# Patient Record
Sex: FEMALE | Race: WHITE | NOT HISPANIC OR LATINO | Employment: FULL TIME | URBAN - METROPOLITAN AREA
[De-identification: names, ages, dates, MRNs, and addresses within clinical notes are randomized per-mention and may not be internally consistent; named-entity substitution may affect disease eponyms.]

---

## 2023-04-13 ENCOUNTER — HOSPITAL ENCOUNTER (INPATIENT)
Facility: CLINIC | Age: 58
LOS: 6 days | Discharge: HOME | DRG: 327 | End: 2023-04-19
Attending: EMERGENCY MEDICINE | Admitting: SURGERY
Payer: COMMERCIAL

## 2023-04-13 ENCOUNTER — APPOINTMENT (OUTPATIENT)
Dept: GENERAL RADIOLOGY | Facility: CLINIC | Age: 58
DRG: 327 | End: 2023-04-13
Attending: EMERGENCY MEDICINE
Payer: COMMERCIAL

## 2023-04-13 ENCOUNTER — APPOINTMENT (OUTPATIENT)
Dept: GENERAL RADIOLOGY | Facility: CLINIC | Age: 58
DRG: 327 | End: 2023-04-13
Attending: SURGERY
Payer: COMMERCIAL

## 2023-04-13 ENCOUNTER — ANESTHESIA (OUTPATIENT)
Dept: SURGERY | Facility: CLINIC | Age: 58
DRG: 327 | End: 2023-04-13
Payer: COMMERCIAL

## 2023-04-13 ENCOUNTER — APPOINTMENT (OUTPATIENT)
Dept: CT IMAGING | Facility: CLINIC | Age: 58
DRG: 327 | End: 2023-04-13
Attending: EMERGENCY MEDICINE
Payer: COMMERCIAL

## 2023-04-13 ENCOUNTER — ANESTHESIA EVENT (OUTPATIENT)
Dept: SURGERY | Facility: CLINIC | Age: 58
DRG: 327 | End: 2023-04-13
Payer: COMMERCIAL

## 2023-04-13 DIAGNOSIS — E87.6 HYPOKALEMIA: ICD-10-CM

## 2023-04-13 DIAGNOSIS — F41.9 ANXIETY: Primary | ICD-10-CM

## 2023-04-13 DIAGNOSIS — K27.5 PERFORATED ULCER (H): ICD-10-CM

## 2023-04-13 LAB
ABO/RH(D): NORMAL
ALBUMIN SERPL BCG-MCNC: 3.7 G/DL (ref 3.5–5.2)
ALP SERPL-CCNC: 56 U/L (ref 35–104)
ALT SERPL W P-5'-P-CCNC: 16 U/L (ref 10–35)
ANION GAP SERPL CALCULATED.3IONS-SCNC: 11 MMOL/L (ref 7–15)
ANTIBODY SCREEN: NEGATIVE
AST SERPL W P-5'-P-CCNC: 18 U/L (ref 10–35)
ATRIAL RATE - MUSE: 84 BPM
BASOPHILS # BLD AUTO: 0 10E3/UL (ref 0–0.2)
BASOPHILS NFR BLD AUTO: 0 %
BILIRUB SERPL-MCNC: 0.4 MG/DL
BUN SERPL-MCNC: 16 MG/DL (ref 6–20)
CALCIUM SERPL-MCNC: 9.5 MG/DL (ref 8.6–10)
CHLORIDE SERPL-SCNC: 96 MMOL/L (ref 98–107)
CREAT SERPL-MCNC: 1.24 MG/DL (ref 0.51–0.95)
CREAT SERPL-MCNC: 1.24 MG/DL (ref 0.51–0.95)
DEPRECATED HCO3 PLAS-SCNC: 30 MMOL/L (ref 22–29)
DIASTOLIC BLOOD PRESSURE - MUSE: NORMAL MMHG
EOSINOPHIL # BLD AUTO: 0.3 10E3/UL (ref 0–0.7)
EOSINOPHIL NFR BLD AUTO: 2 %
ERYTHROCYTE [DISTWIDTH] IN BLOOD BY AUTOMATED COUNT: 14.5 % (ref 10–15)
GFR SERPL CREATININE-BSD FRML MDRD: 51 ML/MIN/1.73M2
GFR SERPL CREATININE-BSD FRML MDRD: 51 ML/MIN/1.73M2
GLUCOSE SERPL-MCNC: 155 MG/DL (ref 70–99)
HCT VFR BLD AUTO: 39.1 % (ref 35–47)
HGB BLD-MCNC: 13.2 G/DL (ref 11.7–15.7)
HOLD SPECIMEN: NORMAL
HOLD SPECIMEN: NORMAL
IMM GRANULOCYTES # BLD: 0 10E3/UL
IMM GRANULOCYTES NFR BLD: 0 %
INTERPRETATION ECG - MUSE: NORMAL
LYMPHOCYTES # BLD AUTO: 1.6 10E3/UL (ref 0.8–5.3)
LYMPHOCYTES NFR BLD AUTO: 14 %
MAGNESIUM SERPL-MCNC: 1.8 MG/DL (ref 1.7–2.3)
MCH RBC QN AUTO: 31.4 PG (ref 26.5–33)
MCHC RBC AUTO-ENTMCNC: 33.8 G/DL (ref 31.5–36.5)
MCV RBC AUTO: 93 FL (ref 78–100)
MONOCYTES # BLD AUTO: 0.6 10E3/UL (ref 0–1.3)
MONOCYTES NFR BLD AUTO: 5 %
NEUTROPHILS # BLD AUTO: 9 10E3/UL (ref 1.6–8.3)
NEUTROPHILS NFR BLD AUTO: 79 %
NRBC # BLD AUTO: 0 10E3/UL
NRBC BLD AUTO-RTO: 0 /100
P AXIS - MUSE: 35 DEGREES
PLATELET # BLD AUTO: 366 10E3/UL (ref 150–450)
POTASSIUM SERPL-SCNC: 2.7 MMOL/L (ref 3.4–5.3)
POTASSIUM SERPL-SCNC: 3.7 MMOL/L (ref 3.4–5.3)
PR INTERVAL - MUSE: 154 MS
PROT SERPL-MCNC: 6.7 G/DL (ref 6.4–8.3)
QRS DURATION - MUSE: 86 MS
QT - MUSE: 380 MS
QTC - MUSE: 449 MS
R AXIS - MUSE: 4 DEGREES
RBC # BLD AUTO: 4.2 10E6/UL (ref 3.8–5.2)
SODIUM SERPL-SCNC: 137 MMOL/L (ref 136–145)
SPECIMEN EXPIRATION DATE: NORMAL
SYSTOLIC BLOOD PRESSURE - MUSE: NORMAL MMHG
T AXIS - MUSE: 14 DEGREES
TROPONIN T SERPL HS-MCNC: 12 NG/L
TROPONIN T SERPL HS-MCNC: 13 NG/L
VENTRICULAR RATE- MUSE: 84 BPM
WBC # BLD AUTO: 11.5 10E3/UL (ref 4–11)

## 2023-04-13 PROCEDURE — 258N000003 HC RX IP 258 OP 636: Performed by: NURSE ANESTHETIST, CERTIFIED REGISTERED

## 2023-04-13 PROCEDURE — 360N000076 HC SURGERY LEVEL 3, PER MIN: Performed by: SURGERY

## 2023-04-13 PROCEDURE — 250N000011 HC RX IP 250 OP 636: Performed by: NURSE ANESTHETIST, CERTIFIED REGISTERED

## 2023-04-13 PROCEDURE — 82565 ASSAY OF CREATININE: CPT | Performed by: PHYSICIAN ASSISTANT

## 2023-04-13 PROCEDURE — 0DU647Z SUPPLEMENT STOMACH WITH AUTOLOGOUS TISSUE SUBSTITUTE, PERCUTANEOUS ENDOSCOPIC APPROACH: ICD-10-PCS | Performed by: SURGERY

## 2023-04-13 PROCEDURE — 85025 COMPLETE CBC W/AUTO DIFF WBC: CPT | Performed by: EMERGENCY MEDICINE

## 2023-04-13 PROCEDURE — 96375 TX/PRO/DX INJ NEW DRUG ADDON: CPT

## 2023-04-13 PROCEDURE — 999N000141 HC STATISTIC PRE-PROCEDURE NURSING ASSESSMENT: Performed by: SURGERY

## 2023-04-13 PROCEDURE — 36415 COLL VENOUS BLD VENIPUNCTURE: CPT | Performed by: EMERGENCY MEDICINE

## 2023-04-13 PROCEDURE — 272N000001 HC OR GENERAL SUPPLY STERILE: Performed by: SURGERY

## 2023-04-13 PROCEDURE — 250N000009 HC RX 250: Performed by: EMERGENCY MEDICINE

## 2023-04-13 PROCEDURE — 0DQ64ZZ REPAIR STOMACH, PERCUTANEOUS ENDOSCOPIC APPROACH: ICD-10-PCS | Performed by: SURGERY

## 2023-04-13 PROCEDURE — C9113 INJ PANTOPRAZOLE SODIUM, VIA: HCPCS | Performed by: PHYSICIAN ASSISTANT

## 2023-04-13 PROCEDURE — 250N000009 HC RX 250: Performed by: SURGERY

## 2023-04-13 PROCEDURE — 80053 COMPREHEN METABOLIC PANEL: CPT | Performed by: EMERGENCY MEDICINE

## 2023-04-13 PROCEDURE — 120N000001 HC R&B MED SURG/OB

## 2023-04-13 PROCEDURE — 84484 ASSAY OF TROPONIN QUANT: CPT | Performed by: EMERGENCY MEDICINE

## 2023-04-13 PROCEDURE — 258N000003 HC RX IP 258 OP 636: Performed by: EMERGENCY MEDICINE

## 2023-04-13 PROCEDURE — 96376 TX/PRO/DX INJ SAME DRUG ADON: CPT

## 2023-04-13 PROCEDURE — 250N000009 HC RX 250: Performed by: NURSE ANESTHETIST, CERTIFIED REGISTERED

## 2023-04-13 PROCEDURE — 43659 UNLISTED LAPS PX STOMACH: CPT | Performed by: SURGERY

## 2023-04-13 PROCEDURE — 250N000013 HC RX MED GY IP 250 OP 250 PS 637: Performed by: NURSE ANESTHETIST, CERTIFIED REGISTERED

## 2023-04-13 PROCEDURE — 99222 1ST HOSP IP/OBS MODERATE 55: CPT | Performed by: PHYSICIAN ASSISTANT

## 2023-04-13 PROCEDURE — 250N000011 HC RX IP 250 OP 636: Performed by: PHYSICIAN ASSISTANT

## 2023-04-13 PROCEDURE — 84132 ASSAY OF SERUM POTASSIUM: CPT | Performed by: PHYSICIAN ASSISTANT

## 2023-04-13 PROCEDURE — C9113 INJ PANTOPRAZOLE SODIUM, VIA: HCPCS | Performed by: EMERGENCY MEDICINE

## 2023-04-13 PROCEDURE — 250N000011 HC RX IP 250 OP 636: Performed by: EMERGENCY MEDICINE

## 2023-04-13 PROCEDURE — 93005 ELECTROCARDIOGRAM TRACING: CPT

## 2023-04-13 PROCEDURE — 99285 EMERGENCY DEPT VISIT HI MDM: CPT | Mod: 25

## 2023-04-13 PROCEDURE — 71046 X-RAY EXAM CHEST 2 VIEWS: CPT

## 2023-04-13 PROCEDURE — 96365 THER/PROPH/DIAG IV INF INIT: CPT | Mod: 59

## 2023-04-13 PROCEDURE — 83735 ASSAY OF MAGNESIUM: CPT | Performed by: PHYSICIAN ASSISTANT

## 2023-04-13 PROCEDURE — 710N000009 HC RECOVERY PHASE 1, LEVEL 1, PER MIN: Performed by: SURGERY

## 2023-04-13 PROCEDURE — 86850 RBC ANTIBODY SCREEN: CPT | Performed by: EMERGENCY MEDICINE

## 2023-04-13 PROCEDURE — 370N000017 HC ANESTHESIA TECHNICAL FEE, PER MIN: Performed by: SURGERY

## 2023-04-13 PROCEDURE — 250N000025 HC SEVOFLURANE, PER MIN: Performed by: SURGERY

## 2023-04-13 PROCEDURE — 43659 UNLISTED LAPS PX STOMACH: CPT | Mod: AS | Performed by: PHYSICIAN ASSISTANT

## 2023-04-13 PROCEDURE — 74177 CT ABD & PELVIS W/CONTRAST: CPT

## 2023-04-13 PROCEDURE — 36415 COLL VENOUS BLD VENIPUNCTURE: CPT | Performed by: PHYSICIAN ASSISTANT

## 2023-04-13 PROCEDURE — 999N000179 XR SURGERY CARM FLUORO LESS THAN 5 MIN W STILLS

## 2023-04-13 RX ORDER — SODIUM CHLORIDE AND POTASSIUM CHLORIDE 150; 900 MG/100ML; MG/100ML
INJECTION, SOLUTION INTRAVENOUS CONTINUOUS
Status: DISCONTINUED | OUTPATIENT
Start: 2023-04-13 | End: 2023-04-16

## 2023-04-13 RX ORDER — GABAPENTIN 300 MG/1
600 CAPSULE ORAL 2 TIMES DAILY
COMMUNITY

## 2023-04-13 RX ORDER — HYDRALAZINE HYDROCHLORIDE 20 MG/ML
10 INJECTION INTRAMUSCULAR; INTRAVENOUS EVERY 6 HOURS PRN
Status: DISCONTINUED | OUTPATIENT
Start: 2023-04-13 | End: 2023-04-19 | Stop reason: HOSPADM

## 2023-04-13 RX ORDER — PROCHLORPERAZINE MALEATE 10 MG
10 TABLET ORAL EVERY 6 HOURS PRN
Status: DISCONTINUED | OUTPATIENT
Start: 2023-04-13 | End: 2023-04-13

## 2023-04-13 RX ORDER — ALBUTEROL SULFATE 90 UG/1
AEROSOL, METERED RESPIRATORY (INHALATION) PRN
Status: DISCONTINUED | OUTPATIENT
Start: 2023-04-13 | End: 2023-04-13

## 2023-04-13 RX ORDER — PIPERACILLIN SODIUM, TAZOBACTAM SODIUM 3; .375 G/15ML; G/15ML
3.38 INJECTION, POWDER, LYOPHILIZED, FOR SOLUTION INTRAVENOUS EVERY 6 HOURS
Status: DISCONTINUED | OUTPATIENT
Start: 2023-04-13 | End: 2023-04-19 | Stop reason: HOSPADM

## 2023-04-13 RX ORDER — PIPERACILLIN SODIUM, TAZOBACTAM SODIUM 3; .375 G/15ML; G/15ML
3.38 INJECTION, POWDER, LYOPHILIZED, FOR SOLUTION INTRAVENOUS ONCE
Status: COMPLETED | OUTPATIENT
Start: 2023-04-13 | End: 2023-04-13

## 2023-04-13 RX ORDER — ONDANSETRON 4 MG/1
4 TABLET, ORALLY DISINTEGRATING ORAL EVERY 30 MIN PRN
Status: DISCONTINUED | OUTPATIENT
Start: 2023-04-13 | End: 2023-04-13 | Stop reason: HOSPADM

## 2023-04-13 RX ORDER — HYDROCHLOROTHIAZIDE 25 MG/1
25 TABLET ORAL DAILY
COMMUNITY

## 2023-04-13 RX ORDER — NALOXONE HYDROCHLORIDE 0.4 MG/ML
0.4 INJECTION, SOLUTION INTRAMUSCULAR; INTRAVENOUS; SUBCUTANEOUS
Status: DISCONTINUED | OUTPATIENT
Start: 2023-04-13 | End: 2023-04-19 | Stop reason: HOSPADM

## 2023-04-13 RX ORDER — FENTANYL CITRATE 50 UG/ML
INJECTION, SOLUTION INTRAMUSCULAR; INTRAVENOUS PRN
Status: DISCONTINUED | OUTPATIENT
Start: 2023-04-13 | End: 2023-04-13

## 2023-04-13 RX ORDER — HYDROMORPHONE HCL IN WATER/PF 6 MG/30 ML
0.2 PATIENT CONTROLLED ANALGESIA SYRINGE INTRAVENOUS
Status: DISCONTINUED | OUTPATIENT
Start: 2023-04-13 | End: 2023-04-19 | Stop reason: HOSPADM

## 2023-04-13 RX ORDER — PROPOFOL 10 MG/ML
INJECTION, EMULSION INTRAVENOUS PRN
Status: DISCONTINUED | OUTPATIENT
Start: 2023-04-13 | End: 2023-04-13

## 2023-04-13 RX ORDER — POTASSIUM CHLORIDE 7.45 MG/ML
10 INJECTION INTRAVENOUS ONCE
Status: COMPLETED | OUTPATIENT
Start: 2023-04-13 | End: 2023-04-13

## 2023-04-13 RX ORDER — HYDROMORPHONE HCL IN WATER/PF 6 MG/30 ML
0.4 PATIENT CONTROLLED ANALGESIA SYRINGE INTRAVENOUS
Status: DISCONTINUED | OUTPATIENT
Start: 2023-04-13 | End: 2023-04-19 | Stop reason: HOSPADM

## 2023-04-13 RX ORDER — SODIUM CHLORIDE, SODIUM LACTATE, POTASSIUM CHLORIDE, CALCIUM CHLORIDE 600; 310; 30; 20 MG/100ML; MG/100ML; MG/100ML; MG/100ML
125 INJECTION, SOLUTION INTRAVENOUS CONTINUOUS
Status: DISCONTINUED | OUTPATIENT
Start: 2023-04-13 | End: 2023-04-13

## 2023-04-13 RX ORDER — SODIUM CHLORIDE, SODIUM LACTATE, POTASSIUM CHLORIDE, CALCIUM CHLORIDE 600; 310; 30; 20 MG/100ML; MG/100ML; MG/100ML; MG/100ML
INJECTION, SOLUTION INTRAVENOUS CONTINUOUS PRN
Status: DISCONTINUED | OUTPATIENT
Start: 2023-04-13 | End: 2023-04-13

## 2023-04-13 RX ORDER — DEXAMETHASONE SODIUM PHOSPHATE 4 MG/ML
INJECTION, SOLUTION INTRA-ARTICULAR; INTRALESIONAL; INTRAMUSCULAR; INTRAVENOUS; SOFT TISSUE PRN
Status: DISCONTINUED | OUTPATIENT
Start: 2023-04-13 | End: 2023-04-13

## 2023-04-13 RX ORDER — SODIUM CHLORIDE, SODIUM LACTATE, POTASSIUM CHLORIDE, CALCIUM CHLORIDE 600; 310; 30; 20 MG/100ML; MG/100ML; MG/100ML; MG/100ML
INJECTION, SOLUTION INTRAVENOUS CONTINUOUS
Status: DISCONTINUED | OUTPATIENT
Start: 2023-04-13 | End: 2023-04-13 | Stop reason: HOSPADM

## 2023-04-13 RX ORDER — ONDANSETRON 4 MG/1
4 TABLET, ORALLY DISINTEGRATING ORAL EVERY 6 HOURS PRN
Status: DISCONTINUED | OUTPATIENT
Start: 2023-04-13 | End: 2023-04-19 | Stop reason: HOSPADM

## 2023-04-13 RX ORDER — IOPAMIDOL 755 MG/ML
135 INJECTION, SOLUTION INTRAVASCULAR ONCE
Status: COMPLETED | OUTPATIENT
Start: 2023-04-13 | End: 2023-04-13

## 2023-04-13 RX ORDER — DIAZEPAM 10 MG/2ML
2.5 INJECTION, SOLUTION INTRAMUSCULAR; INTRAVENOUS EVERY 4 HOURS PRN
Status: DISCONTINUED | OUTPATIENT
Start: 2023-04-13 | End: 2023-04-18

## 2023-04-13 RX ORDER — LIDOCAINE HYDROCHLORIDE 20 MG/ML
INJECTION, SOLUTION INFILTRATION; PERINEURAL PRN
Status: DISCONTINUED | OUTPATIENT
Start: 2023-04-13 | End: 2023-04-13

## 2023-04-13 RX ORDER — ENOXAPARIN SODIUM 100 MG/ML
40 INJECTION SUBCUTANEOUS EVERY 12 HOURS
Status: DISCONTINUED | OUTPATIENT
Start: 2023-04-14 | End: 2023-04-19 | Stop reason: HOSPADM

## 2023-04-13 RX ORDER — NALOXONE HYDROCHLORIDE 0.4 MG/ML
0.2 INJECTION, SOLUTION INTRAMUSCULAR; INTRAVENOUS; SUBCUTANEOUS
Status: DISCONTINUED | OUTPATIENT
Start: 2023-04-13 | End: 2023-04-19 | Stop reason: HOSPADM

## 2023-04-13 RX ORDER — ONDANSETRON 2 MG/ML
4 INJECTION INTRAMUSCULAR; INTRAVENOUS ONCE
Status: COMPLETED | OUTPATIENT
Start: 2023-04-13 | End: 2023-04-13

## 2023-04-13 RX ORDER — ONDANSETRON 2 MG/ML
INJECTION INTRAMUSCULAR; INTRAVENOUS PRN
Status: DISCONTINUED | OUTPATIENT
Start: 2023-04-13 | End: 2023-04-13

## 2023-04-13 RX ORDER — FENTANYL CITRATE 50 UG/ML
50 INJECTION, SOLUTION INTRAMUSCULAR; INTRAVENOUS EVERY 5 MIN PRN
Status: DISCONTINUED | OUTPATIENT
Start: 2023-04-13 | End: 2023-04-13 | Stop reason: HOSPADM

## 2023-04-13 RX ORDER — HYDROMORPHONE HYDROCHLORIDE 1 MG/ML
0.5 INJECTION, SOLUTION INTRAMUSCULAR; INTRAVENOUS; SUBCUTANEOUS
Status: COMPLETED | OUTPATIENT
Start: 2023-04-13 | End: 2023-04-13

## 2023-04-13 RX ORDER — FLUCONAZOLE 2 MG/ML
400 INJECTION, SOLUTION INTRAVENOUS EVERY 24 HOURS
Status: DISCONTINUED | OUTPATIENT
Start: 2023-04-13 | End: 2023-04-13

## 2023-04-13 RX ORDER — DEXMEDETOMIDINE HYDROCHLORIDE 4 UG/ML
INJECTION, SOLUTION INTRAVENOUS PRN
Status: DISCONTINUED | OUTPATIENT
Start: 2023-04-13 | End: 2023-04-13

## 2023-04-13 RX ORDER — POTASSIUM CHLORIDE 7.45 MG/ML
INJECTION INTRAVENOUS PRN
Status: DISCONTINUED | OUTPATIENT
Start: 2023-04-13 | End: 2023-04-13

## 2023-04-13 RX ORDER — ONDANSETRON 2 MG/ML
4 INJECTION INTRAMUSCULAR; INTRAVENOUS EVERY 6 HOURS PRN
Status: DISCONTINUED | OUTPATIENT
Start: 2023-04-13 | End: 2023-04-19 | Stop reason: HOSPADM

## 2023-04-13 RX ORDER — MAGNESIUM SULFATE HEPTAHYDRATE 40 MG/ML
2 INJECTION, SOLUTION INTRAVENOUS ONCE
Status: COMPLETED | OUTPATIENT
Start: 2023-04-13 | End: 2023-04-13

## 2023-04-13 RX ORDER — FENTANYL CITRATE 50 UG/ML
25 INJECTION, SOLUTION INTRAMUSCULAR; INTRAVENOUS EVERY 5 MIN PRN
Status: DISCONTINUED | OUTPATIENT
Start: 2023-04-13 | End: 2023-04-13 | Stop reason: HOSPADM

## 2023-04-13 RX ORDER — PROPOFOL 10 MG/ML
INJECTION, EMULSION INTRAVENOUS CONTINUOUS PRN
Status: DISCONTINUED | OUTPATIENT
Start: 2023-04-13 | End: 2023-04-13

## 2023-04-13 RX ORDER — HYDROMORPHONE HCL IN WATER/PF 6 MG/30 ML
0.2 PATIENT CONTROLLED ANALGESIA SYRINGE INTRAVENOUS EVERY 5 MIN PRN
Status: DISCONTINUED | OUTPATIENT
Start: 2023-04-13 | End: 2023-04-13 | Stop reason: HOSPADM

## 2023-04-13 RX ORDER — HYDROMORPHONE HCL IN WATER/PF 6 MG/30 ML
0.4 PATIENT CONTROLLED ANALGESIA SYRINGE INTRAVENOUS EVERY 5 MIN PRN
Status: DISCONTINUED | OUTPATIENT
Start: 2023-04-13 | End: 2023-04-13 | Stop reason: HOSPADM

## 2023-04-13 RX ORDER — ACETAMINOPHEN 650 MG/1
650 SUPPOSITORY RECTAL EVERY 4 HOURS PRN
Status: DISCONTINUED | OUTPATIENT
Start: 2023-04-13 | End: 2023-04-17

## 2023-04-13 RX ORDER — ONDANSETRON 2 MG/ML
4 INJECTION INTRAMUSCULAR; INTRAVENOUS EVERY 30 MIN PRN
Status: DISCONTINUED | OUTPATIENT
Start: 2023-04-13 | End: 2023-04-13 | Stop reason: HOSPADM

## 2023-04-13 RX ORDER — ONDANSETRON 2 MG/ML
4 INJECTION INTRAMUSCULAR; INTRAVENOUS EVERY 30 MIN PRN
Status: DISCONTINUED | OUTPATIENT
Start: 2023-04-13 | End: 2023-04-13

## 2023-04-13 RX ORDER — LIDOCAINE 40 MG/G
CREAM TOPICAL
Status: DISCONTINUED | OUTPATIENT
Start: 2023-04-13 | End: 2023-04-19 | Stop reason: HOSPADM

## 2023-04-13 RX ORDER — LIDOCAINE HYDROCHLORIDE 10 MG/ML
INJECTION, SOLUTION INFILTRATION; PERINEURAL
Status: DISCONTINUED
Start: 2023-04-13 | End: 2023-04-13 | Stop reason: HOSPADM

## 2023-04-13 RX ORDER — PIPERACILLIN SODIUM, TAZOBACTAM SODIUM 3; .375 G/15ML; G/15ML
INJECTION, POWDER, LYOPHILIZED, FOR SOLUTION INTRAVENOUS PRN
Status: DISCONTINUED | OUTPATIENT
Start: 2023-04-13 | End: 2023-04-13

## 2023-04-13 RX ORDER — LOSARTAN POTASSIUM 100 MG/1
100 TABLET ORAL DAILY
COMMUNITY

## 2023-04-13 RX ORDER — BUSPIRONE HYDROCHLORIDE 30 MG/1
30 TABLET ORAL
Status: ON HOLD | COMMUNITY
End: 2023-04-14

## 2023-04-13 RX ORDER — AMLODIPINE BESYLATE 5 MG/1
5 TABLET ORAL DAILY
COMMUNITY

## 2023-04-13 RX ORDER — OXYCODONE HYDROCHLORIDE 5 MG/1
5 TABLET ORAL EVERY 4 HOURS PRN
Status: DISCONTINUED | OUTPATIENT
Start: 2023-04-13 | End: 2023-04-13

## 2023-04-13 RX ADMIN — PHENYLEPHRINE HYDROCHLORIDE 100 MCG: 10 INJECTION INTRAVENOUS at 08:30

## 2023-04-13 RX ADMIN — ROCURONIUM BROMIDE 10 MG: 50 INJECTION, SOLUTION INTRAVENOUS at 09:46

## 2023-04-13 RX ADMIN — POTASSIUM CHLORIDE 10 MEQ: 7.46 INJECTION, SOLUTION INTRAVENOUS at 10:35

## 2023-04-13 RX ADMIN — PHENYLEPHRINE HYDROCHLORIDE 0.5 MCG/KG/MIN: 10 INJECTION INTRAVENOUS at 08:28

## 2023-04-13 RX ADMIN — DEXMEDETOMIDINE HYDROCHLORIDE 12 MCG: 200 INJECTION INTRAVENOUS at 08:45

## 2023-04-13 RX ADMIN — PANTOPRAZOLE SODIUM 80 MG: 40 INJECTION, POWDER, FOR SOLUTION INTRAVENOUS at 07:23

## 2023-04-13 RX ADMIN — SODIUM CHLORIDE, POTASSIUM CHLORIDE, SODIUM LACTATE AND CALCIUM CHLORIDE: 600; 310; 30; 20 INJECTION, SOLUTION INTRAVENOUS at 08:25

## 2023-04-13 RX ADMIN — PROPOFOL 200 MG: 10 INJECTION, EMULSION INTRAVENOUS at 08:29

## 2023-04-13 RX ADMIN — ONDANSETRON 4 MG: 2 INJECTION INTRAMUSCULAR; INTRAVENOUS at 05:53

## 2023-04-13 RX ADMIN — HYDROMORPHONE HYDROCHLORIDE 0.4 MG: 0.2 INJECTION, SOLUTION INTRAMUSCULAR; INTRAVENOUS; SUBCUTANEOUS at 19:59

## 2023-04-13 RX ADMIN — MIDAZOLAM 1 MG: 1 INJECTION INTRAMUSCULAR; INTRAVENOUS at 08:22

## 2023-04-13 RX ADMIN — PIPERACILLIN AND TAZOBACTAM 3.38 G: 3; .375 INJECTION, POWDER, FOR SOLUTION INTRAVENOUS at 06:42

## 2023-04-13 RX ADMIN — FENTANYL CITRATE 50 MCG: 50 INJECTION, SOLUTION INTRAMUSCULAR; INTRAVENOUS at 09:01

## 2023-04-13 RX ADMIN — ROCURONIUM BROMIDE 10 MG: 50 INJECTION, SOLUTION INTRAVENOUS at 09:30

## 2023-04-13 RX ADMIN — SODIUM CHLORIDE, SODIUM LACTATE, POTASSIUM CHLORIDE, CALCIUM CHLORIDE: 600; 310; 30; 20 INJECTION, SOLUTION INTRAVENOUS at 09:00

## 2023-04-13 RX ADMIN — FENTANYL CITRATE 50 MCG: 50 INJECTION, SOLUTION INTRAMUSCULAR; INTRAVENOUS at 08:22

## 2023-04-13 RX ADMIN — DEXAMETHASONE SODIUM PHOSPHATE 4 MG: 4 INJECTION, SOLUTION INTRA-ARTICULAR; INTRALESIONAL; INTRAMUSCULAR; INTRAVENOUS; SOFT TISSUE at 08:45

## 2023-04-13 RX ADMIN — PIPERACILLIN AND TAZOBACTAM 3.38 G: 3; .375 INJECTION, POWDER, FOR SOLUTION INTRAVENOUS at 14:04

## 2023-04-13 RX ADMIN — HYDROMORPHONE HYDROCHLORIDE 0.5 MG: 1 INJECTION, SOLUTION INTRAMUSCULAR; INTRAVENOUS; SUBCUTANEOUS at 05:55

## 2023-04-13 RX ADMIN — PROPOFOL 30 MCG/KG/MIN: 10 INJECTION, EMULSION INTRAVENOUS at 08:45

## 2023-04-13 RX ADMIN — HYDROMORPHONE HYDROCHLORIDE 0.5 MG: 1 INJECTION, SOLUTION INTRAMUSCULAR; INTRAVENOUS; SUBCUTANEOUS at 06:40

## 2023-04-13 RX ADMIN — HYDROMORPHONE HYDROCHLORIDE 0.4 MG: 0.2 INJECTION, SOLUTION INTRAMUSCULAR; INTRAVENOUS; SUBCUTANEOUS at 17:08

## 2023-04-13 RX ADMIN — ALBUTEROL SULFATE 4 PUFF: 90 AEROSOL, METERED RESPIRATORY (INHALATION) at 08:40

## 2023-04-13 RX ADMIN — ROCURONIUM BROMIDE 20 MG: 50 INJECTION, SOLUTION INTRAVENOUS at 08:56

## 2023-04-13 RX ADMIN — LIDOCAINE HYDROCHLORIDE 100 MG: 20 INJECTION, SOLUTION INFILTRATION; PERINEURAL at 08:29

## 2023-04-13 RX ADMIN — ROCURONIUM BROMIDE 10 MG: 50 INJECTION, SOLUTION INTRAVENOUS at 09:15

## 2023-04-13 RX ADMIN — ROCURONIUM BROMIDE 45 MG: 50 INJECTION, SOLUTION INTRAVENOUS at 08:40

## 2023-04-13 RX ADMIN — SUGAMMADEX 300 MG: 100 INJECTION, SOLUTION INTRAVENOUS at 10:42

## 2023-04-13 RX ADMIN — HYDROMORPHONE HYDROCHLORIDE 0.4 MG: 0.2 INJECTION, SOLUTION INTRAMUSCULAR; INTRAVENOUS; SUBCUTANEOUS at 21:59

## 2023-04-13 RX ADMIN — SODIUM CHLORIDE, SODIUM LACTATE, POTASSIUM CHLORIDE, CALCIUM CHLORIDE: 600; 310; 30; 20 INJECTION, SOLUTION INTRAVENOUS at 08:19

## 2023-04-13 RX ADMIN — HYDROMORPHONE HYDROCHLORIDE 0.5 MG: 1 INJECTION, SOLUTION INTRAMUSCULAR; INTRAVENOUS; SUBCUTANEOUS at 10:20

## 2023-04-13 RX ADMIN — HYDROMORPHONE HYDROCHLORIDE 0.5 MG: 1 INJECTION, SOLUTION INTRAMUSCULAR; INTRAVENOUS; SUBCUTANEOUS at 09:32

## 2023-04-13 RX ADMIN — ONDANSETRON 4 MG: 2 INJECTION INTRAMUSCULAR; INTRAVENOUS at 10:22

## 2023-04-13 RX ADMIN — ROCURONIUM BROMIDE 5 MG: 50 INJECTION, SOLUTION INTRAVENOUS at 08:29

## 2023-04-13 RX ADMIN — POTASSIUM CHLORIDE AND SODIUM CHLORIDE: 900; 150 INJECTION, SOLUTION INTRAVENOUS at 12:58

## 2023-04-13 RX ADMIN — POTASSIUM CHLORIDE 10 MEQ: 7.46 INJECTION, SOLUTION INTRAVENOUS at 09:34

## 2023-04-13 RX ADMIN — ONDANSETRON 4 MG: 2 INJECTION INTRAMUSCULAR; INTRAVENOUS at 02:33

## 2023-04-13 RX ADMIN — DEXMEDETOMIDINE HYDROCHLORIDE 8 MCG: 200 INJECTION INTRAVENOUS at 09:25

## 2023-04-13 RX ADMIN — POTASSIUM CHLORIDE 10 MEQ: 7.46 INJECTION, SOLUTION INTRAVENOUS at 08:35

## 2023-04-13 RX ADMIN — POTASSIUM CHLORIDE AND SODIUM CHLORIDE: 900; 150 INJECTION, SOLUTION INTRAVENOUS at 21:59

## 2023-04-13 RX ADMIN — PANTOPRAZOLE SODIUM 40 MG: 40 INJECTION, POWDER, FOR SOLUTION INTRAVENOUS at 21:59

## 2023-04-13 RX ADMIN — SUCCINYLCHOLINE CHLORIDE 180 MG: 20 INJECTION, SOLUTION INTRAMUSCULAR; INTRAVENOUS; PARENTERAL at 08:29

## 2023-04-13 RX ADMIN — PIPERACILLIN AND TAZOBACTAM 3.38 G: 3; .375 INJECTION, POWDER, FOR SOLUTION INTRAVENOUS at 09:58

## 2023-04-13 RX ADMIN — MAGNESIUM SULFATE HEPTAHYDRATE 2 G: 40 INJECTION, SOLUTION INTRAVENOUS at 08:04

## 2023-04-13 RX ADMIN — SODIUM CHLORIDE 79 ML: 9 INJECTION, SOLUTION INTRAVENOUS at 06:16

## 2023-04-13 RX ADMIN — SODIUM CHLORIDE, POTASSIUM CHLORIDE, SODIUM LACTATE AND CALCIUM CHLORIDE 1000 ML: 600; 310; 30; 20 INJECTION, SOLUTION INTRAVENOUS at 07:28

## 2023-04-13 RX ADMIN — POTASSIUM CHLORIDE 10 MEQ: 7.46 INJECTION, SOLUTION INTRAVENOUS at 07:37

## 2023-04-13 RX ADMIN — PANTOPRAZOLE SODIUM 40 MG: 40 INJECTION, POWDER, FOR SOLUTION INTRAVENOUS at 14:03

## 2023-04-13 RX ADMIN — PIPERACILLIN AND TAZOBACTAM 3.38 G: 3; .375 INJECTION, POWDER, FOR SOLUTION INTRAVENOUS at 18:33

## 2023-04-13 RX ADMIN — IOPAMIDOL 135 ML: 755 INJECTION, SOLUTION INTRAVENOUS at 06:15

## 2023-04-13 RX ADMIN — HYDROMORPHONE HYDROCHLORIDE 0.2 MG: 0.2 INJECTION, SOLUTION INTRAMUSCULAR; INTRAVENOUS; SUBCUTANEOUS at 15:05

## 2023-04-13 RX ADMIN — ALBUTEROL SULFATE 4 PUFF: 90 AEROSOL, METERED RESPIRATORY (INHALATION) at 10:42

## 2023-04-13 RX ADMIN — HYDROMORPHONE HYDROCHLORIDE 0.5 MG: 1 INJECTION, SOLUTION INTRAMUSCULAR; INTRAVENOUS; SUBCUTANEOUS at 07:22

## 2023-04-13 ASSESSMENT — ENCOUNTER SYMPTOMS
SEIZURES: 0
ABDOMINAL PAIN: 1
NAUSEA: 1
DIARRHEA: 0
CONSTIPATION: 0
VOMITING: 0
DYSRHYTHMIAS: 0
ARTHRALGIAS: 1

## 2023-04-13 ASSESSMENT — ACTIVITIES OF DAILY LIVING (ADL)
ADLS_ACUITY_SCORE: 39
ADLS_ACUITY_SCORE: 35
ADLS_ACUITY_SCORE: 35
ADLS_ACUITY_SCORE: 39
ADLS_ACUITY_SCORE: 35
ADLS_ACUITY_SCORE: 39
ADLS_ACUITY_SCORE: 39
ADLS_ACUITY_SCORE: 35
ADLS_ACUITY_SCORE: 35
ADLS_ACUITY_SCORE: 39
ADLS_ACUITY_SCORE: 35

## 2023-04-13 ASSESSMENT — LIFESTYLE VARIABLES: TOBACCO_USE: 0

## 2023-04-13 NOTE — ANESTHESIA CARE TRANSFER NOTE
Patient: Silvia Yoon    Procedure: Procedure(s):  Diagnostic laparoscopy with repair of perforated ulcer,       Diagnosis: Perforated ulcer (H) [K27.5]  Diagnosis Additional Information: No value filed.    Anesthesia Type:   General     Note:    Oropharynx: oropharynx clear of all foreign objects and spontaneously breathing  Level of Consciousness: drowsy  Oxygen Supplementation: face mask  Level of Supplemental Oxygen (L/min / FiO2): 8  Independent Airway: airway patency satisfactory and stable  Dentition: dentition unchanged  Vital Signs Stable: post-procedure vital signs reviewed and stable  Report to RN Given: handoff report given  Patient transferred to: PACU    Handoff Report: Identifed the Patient, Identified the Reponsible Provider, Reviewed the pertinent medical history, Discussed the surgical course, Reviewed Intra-OP anesthesia mangement and issues during anesthesia, Set expectations for post-procedure period and Allowed opportunity for questions and acknowledgement of understanding      Vitals:  Vitals Value Taken Time   /96 04/13/23 1100   Temp     Pulse 101 04/13/23 1103   Resp 23 04/13/23 1103   SpO2 96 % 04/13/23 1103       Electronically Signed By: ELÍAS Sawant CRNA  April 13, 2023  11:04 AM

## 2023-04-13 NOTE — ANESTHESIA PROCEDURE NOTES
Airway       Patient location during procedure: OR       Procedure Start/Stop Times: 4/13/2023 8:32 AM  Staff -        Anesthesiologist:  Celio Alberts MD       CRNA: Demarco William APRN CRNA       Performed By: CRNA  Consent for Airway        Urgency: elective  Indications and Patient Condition       Indications for airway management: tanner-procedural and airway protection       Induction type:RSI       Mask difficulty assessment: 0 - not attempted    Final Airway Details       Final airway type: endotracheal airway       Successful airway: ETT - single and Oral  Endotracheal Airway Details        ETT size (mm): 7.0       Cuffed: yes       Successful intubation technique: video laryngoscopy       VL Blade Size: Glidescope 3       Grade View of Cords: 1       Adjucts: stylet       Position: Right       Measured from: lips       Secured at (cm): 21       Bite block used: None    Post intubation assessment        Placement verified by: capnometry, equal breath sounds and chest rise        Number of attempts at approach: 1       Number of other approaches attempted: 0       Secured with: pink tape       Ease of procedure: easy       Dentition: Unchanged    Medication(s) Administered   Medication Administration Time: 4/13/2023 8:32 AM

## 2023-04-13 NOTE — ANESTHESIA PREPROCEDURE EVALUATION
Anesthesia Pre-Procedure Evaluation    Patient: Silvia Yoon   MRN: [id number] : 1965        Procedure : Procedure(s):  Diagnostic laparoscopy with repair of perforated ulcer, possible laparotomy          No past medical history on file.   No past surgical history on file.   Allergies   Allergen Reactions     Naproxen Unknown     Club feet and burning ankles.      Social History     Tobacco Use     Smoking status: Not on file     Smokeless tobacco: Not on file   Substance Use Topics     Alcohol use: Not on file      Wt Readings from Last 1 Encounters:   23 (!) 158.8 kg (350 lb)        Anesthesia Evaluation   Pt has had prior anesthetic. Type: General.    No history of anesthetic complications       ROS/MED HX  ENT/Pulmonary:     (+) sleep apnea, uses CPAP,  (-) tobacco use and asthma   Neurologic:     (+) no peripheral neuropathy  (-) no seizures and no CVA   Cardiovascular:     (+) hypertension----- (-) CAD and arrhythmias   METS/Exercise Tolerance:     Hematologic:       Musculoskeletal:       GI/Hepatic: Comment: Perforated ulcer    (+) GERD,     Renal/Genitourinary:    (-) renal disease   Endo:     (+) Obesity,  (-) Type II DM and thyroid disease   Psychiatric/Substance Use:       Infectious Disease:    (-) Recent Fever   Malignancy:       Other: Comment: Low potassium and magnesium, being replaced           Physical Exam    Airway  airway exam normal      Mallampati: III   TM distance: > 3 FB   Neck ROM: full   Mouth opening: > 3 cm    Respiratory Devices and Support         Dental       (+) Minor Abnormalities - some fillings, tiny chips      Cardiovascular   cardiovascular exam normal          Pulmonary   pulmonary exam normal                   Recent Results (from the past 744 hour(s))   XR Chest 2 Views    Narrative    EXAM: XR CHEST 2 VIEWS  LOCATION: [facility]  DATE/TIME: 2023 4:59 AM CDT    INDICATION: chest pain  COMPARISON: None.      Impression     IMPRESSION: Free air under both hemidiaphragms do not appear to be intraluminal, concerning for pneumoperitoneum. Lungs, costophrenic angles clear. Heart size normal.   CT Abdomen Pelvis w Contrast    Narrative    EXAM: CT ABDOMEN PELVIS W CONTRAST  LOCATION: [facility]  DATE/TIME: 4/13/2023 6:31 AM CDT    INDICATION: abd pain, free air on CXR  COMPARISON: None.  TECHNIQUE: CT scan of the abdomen and pelvis was performed following injection of IV contrast. Multiplanar reformats were obtained. Dose reduction techniques were used.  CONTRAST: 135mL Isovue 370    FINDINGS:   LOWER CHEST: Normal.    HEPATOBILIARY: Normal.    PANCREAS: Normal.    SPLEEN: Normal.    ADRENAL GLANDS: Normal.    KIDNEYS/BLADDER: Normal.    BOWEL: Free air in the upper abdomen anteriorly with stranding in the region of the stomach and duodenum. There is an air-fluid collection posterior to the stomach containing some high attenuation fluid posteriorly. Findings are concerning for perforated   ulcer. There is some free fluid adjacent to the spleen. Moderate free fluid in the pelvic cul-de-sac. Clinical correlation for exudative fluid at the time of surgery. Findings were discussed with the ordering clinician, HERMES MCMULLEN, at approximately   0642 hours. The appendix is seen and appears within normal limits.. Remainder of the bowel gas pattern is nonspecific.    LYMPH NODES: Normal.    VASCULATURE: Unremarkable.    PELVIC ORGANS: 6 the uterus and bilateral adnexal structures appear within normal limits.    MUSCULOSKELETAL: Moderate lumbar spondylotic changes.      Impression    IMPRESSION:   1.  Free air in the upper abdomen anteriorly with stranding in the region of the stomach and duodenum. There is an air-fluid collection posterior to the stomach containing some high attenuation fluid posteriorly. Findings are concerning for perforated   ulcer. There is some free fluid adjacent to the spleen. Moderate free fluid  in the pelvic cul-de-sac. Clinical correlation for exudative fluid at the time of surgery. Findings were discussed with the ordering clinician, HERMES MCMULLEN, at approximately   0642 hours. Remainder of the bowel gas pattern is nonspecific.       OUTSIDE LABS:  CBC:   Lab Results   Component Value Date    WBC 11.5 (H) 04/13/2023    HGB 13.2 04/13/2023    HCT 39.1 04/13/2023     04/13/2023     BMP:   Lab Results   Component Value Date     04/13/2023    POTASSIUM 2.7 (L) 04/13/2023    CHLORIDE 96 (L) 04/13/2023    CO2 30 (H) 04/13/2023    BUN 16.0 04/13/2023    CR 1.24 (H) 04/13/2023     (H) 04/13/2023     COAGS: No results found for: PTT, INR, FIBR  POC: No results found for: BGM, HCG, HCGS  HEPATIC:   Lab Results   Component Value Date    ALBUMIN 3.7 04/13/2023    PROTTOTAL 6.7 04/13/2023    ALT 16 04/13/2023    AST 18 04/13/2023    ALKPHOS 56 04/13/2023    BILITOTAL 0.4 04/13/2023     OTHER:   Lab Results   Component Value Date    PHILIPP 9.5 04/13/2023       Anesthesia Plan    ASA Status:  3, emergent    NPO Status:  ELEVATED Aspiration Risk/Unknown (>8 hours NPO)    Anesthesia Type: General.     - Airway: ETT   Induction: RSI.   Maintenance: Balanced.   Techniques and Equipment:     - Airway: Video-Laryngoscope         Consents    Anesthesia Plan(s) and associated risks, benefits, and realistic alternatives discussed. Questions answered and patient/representative(s) expressed understanding.    - Discussed:     - Discussed with:  Patient         Postoperative Care    Pain management: IV analgesics, Oral pain medications.   PONV prophylaxis: Background Propofol Infusion, Ondansetron (or other 5HT-3), Dexamethasone or Solumedrol     Comments:                Celio Alberts MD

## 2023-04-13 NOTE — ED PROVIDER NOTES
"  History     Chief Complaint:  Chest Pain       HPI   Silvia Yoon is a 57 year old female with a history of hypertension who presents with abdominal pain. Silvia states that she has generally been feeling unwell for the past few days. Last night, however, Silvia states that she was awoken from sleep at 2315 with sudden severe chest and abdominal pain. Since then, Silvia has also noted some nausea and gait issues due to pain. During evaluation, Silvia states that her pain does radiate into her left shoulder and is made worse with deep inspiration. She denies any constipation, diarrhea, or emesis.     Independent Historian:   None - Patient Only      ROS:  Review of Systems   Cardiovascular: Positive for chest pain.   Gastrointestinal: Positive for abdominal pain and nausea. Negative for constipation, diarrhea and vomiting.   Musculoskeletal: Positive for arthralgias (left shoulder) and gait problem.   All other systems reviewed and are negative.      Allergies:  Naproxen     Medications:    Amlodipine   Buspirone   Gabapentin  Hydrochlorothiazide   Losartan   Trazodone     Past Medical History:    Hypertension  Lumbar spinal stenosis   Insomnia  Iron deficiency anemia  Obesity  ADD  Anxiety   RLS  Depression  Vitamin D deficiency  Panic disorder   Fibromatosis   GERD    Past Surgical History:    Adenoidectomy   Breast fibroid removal   Excision lesion, back  Tonsillectomy  Right foot surgery      Family History:    Arthritis - mother  Asthma - mother  Diabetes - mother  Heart disease - mother  Hypertension - mother    Social History:  Patient is unaccompanied in the ED.  Patient has no established PCP in the system.     Physical Exam     Patient Vitals for the past 24 hrs:   BP Temp Temp src Pulse Resp SpO2 Height Weight   04/13/23 0330 (!) 166/95 -- -- 83 26 99 % -- --   04/13/23 0300 (!) 156/100 -- -- 85 20 99 % -- --   04/13/23 0236 (!) 143/94 98.7  F (37.1  C) Oral -- -- -- 1.626 m (5' 4\") (!) 158.8 " kg (350 lb)   04/13/23 0223 -- -- -- 84 23 97 % -- --        Physical Exam  General: Appears well-developed and well-nourished.   Head: No signs of trauma.   CV: Normal rate and regular rhythm.    Resp: Effort normal and breath sounds normal. No respiratory distress.   GI: Soft. There is diffuse tenderness.  No rebound or guarding.  Normal bowel sounds.    MSK: Normal range of motion. no edema. No Calf tenderness.  Neuro: The patient is alert and oriented. Speech normal.  Skin: Skin is warm and dry. No rash noted.   Psych: normal mood and affect. behavior is normal.       Emergency Department Course   ECG  ECG taken at 0206, ECG read at 0541  Normal sinus rhythm  Nonspecific ST and T wave abnormality  Abnormal ECG   Rate 84 bpm. RI interval 154 ms. QRS duration 86 ms. QT/QTc 380/449 ms. P-R-T axes 35 4 14.     Imaging:  CT Abdomen Pelvis w Contrast   Final Result   IMPRESSION:    1.  Free air in the upper abdomen anteriorly with stranding in the region of the stomach and duodenum. There is an air-fluid collection posterior to the stomach containing some high attenuation fluid posteriorly. Findings are concerning for perforated    ulcer. There is some free fluid adjacent to the spleen. Moderate free fluid in the pelvic cul-de-sac. Clinical correlation for exudative fluid at the time of surgery. Findings were discussed with the ordering clinician, HERMES MCMULLEN, at approximately    0642 hours. Remainder of the bowel gas pattern is nonspecific.      XR Chest 2 Views   Final Result   IMPRESSION: Free air under both hemidiaphragms do not appear to be intraluminal, concerning for pneumoperitoneum. Lungs, costophrenic angles clear. Heart size normal.         Report per radiology    Laboratory:  Labs Ordered and Resulted from Time of ED Arrival to Time of ED Departure   COMPREHENSIVE METABOLIC PANEL - Abnormal       Result Value    Sodium 137      Potassium 2.7 (*)     Chloride 96 (*)     Carbon Dioxide (CO2) 30 (*)      Anion Gap 11      Urea Nitrogen 16.0      Creatinine 1.24 (*)     Calcium 9.5      Glucose 155 (*)     Alkaline Phosphatase 56      AST 18      ALT 16      Protein Total 6.7      Albumin 3.7      Bilirubin Total 0.4      GFR Estimate 51 (*)    CBC WITH PLATELETS AND DIFFERENTIAL - Abnormal    WBC Count 11.5 (*)     RBC Count 4.20      Hemoglobin 13.2      Hematocrit 39.1      MCV 93      MCH 31.4      MCHC 33.8      RDW 14.5      Platelet Count 366      % Neutrophils 79      % Lymphocytes 14      % Monocytes 5      % Eosinophils 2      % Basophils 0      % Immature Granulocytes 0      NRBCs per 100 WBC 0      Absolute Neutrophils 9.0 (*)     Absolute Lymphocytes 1.6      Absolute Monocytes 0.6      Absolute Eosinophils 0.3      Absolute Basophils 0.0      Absolute Immature Granulocytes 0.0      Absolute NRBCs 0.0     TROPONIN T, HIGH SENSITIVITY - Normal    Troponin T, High Sensitivity 13     TROPONIN T, HIGH SENSITIVITY - Normal    Troponin T, High Sensitivity 12          Procedures   none    Emergency Department Course & Assessments:       Interventions:  Medications   lactated ringers BOLUS 1,000 mL (has no administration in time range)     Followed by   lactated ringers infusion (has no administration in time range)   ondansetron (ZOFRAN) injection 4 mg (4 mg Intravenous $Given 4/13/23 9562)   HYDROmorphone (PF) (DILAUDID) injection 0.5 mg (0.5 mg Intravenous $Given 4/13/23 0640)   piperacillin-tazobactam (ZOSYN) 3.375 g vial to attach to  mL bag (3.375 g Intravenous $New Bag 4/13/23 0605)   potassium chloride 10 mEq in 100 mL sterile water infusion (has no administration in time range)   magnesium sulfate 2 g in 50 mL sterile water intermittent infusion (has no administration in time range)   pantoprazole (PROTONIX) IV push injection 80 mg (has no administration in time range)   ondansetron (ZOFRAN) injection 4 mg (4 mg Intravenous $Given 4/13/23 5745)   iopamidol (ISOVUE-370) solution 135 mL (135 mLs  Intravenous $Given 4/13/23 0615)   Saline Flush (79 mLs Intravenous $Given 4/13/23 0616)        Assessments:  0532 I obtained history and examined the patient as noted above.    Independent Interpretation (X-rays, CTs, rhythm strip):  Independent interpretation of CXR no pneumothorax, free air under the diaphragm     Consultations/Discussion of Management or Tests:  7:10 AM Spoke with Dr. Marcos with General Surgery who will see the pt in the ER.       Social Determinants of Health affecting care:   None    Disposition:  The patient was sent to OR under the care of Dr. Marcos.     Impression & Plan    CMS Diagnoses: None      Medical Decision Making:  Silvia Yoon is a 57-year-old woman who presents due to abdominal pain. She reports over the last couple of days she has felt unwell, but last night she woke up with severe abdominal pain radiating to the left shoulder. On my evaluation she had tenderness primarily to the upper abdomen. Given the complaint of some shoulder and chest discomfort she had protocol orders done including a chest x-ray.  Chest x-ray showed free air under the diaphragm.  CT scan was obtained that showed concerns for a perforated ulcer could be either gastric or duodenal.  She was given IV antibiotics along with Protonix and surgery was consulted with plans to take her to the OR.  Her potassium was low and she was given IV potassium and magnesium.      Diagnosis:    ICD-10-CM    1. Perforated ulcer (H)  K27.5 Case Request: Diagnostic laparoscopy with repair of perforated ulcer, possible laparotomy     Case Request: Diagnostic laparoscopy with repair of perforated ulcer, possible laparotomy      2. Hypokalemia  E87.6            Scribe Disclosure:  I, Althea Hathaway, am serving as a scribe at 5:34 AM on 4/13/2023 to document services personally performed by Oscar Marques MD based on my observations and the provider's statements to me.     4/13/2023   Oscar Marques MD       Oscar Marques MD  04/13/23 0738       Oscar Marques MD  04/13/23 0739

## 2023-04-13 NOTE — H&P
"[facility] General Surgery Consultation    Silvia Yoon MRN# [id number]   YOB: 1965 Age: 57 year old      Date of Admission:  4/13/2023  Date of Consult: 4/13/2023         Assessment and Plan:   Patient is a 57 year old female with perforated peptic ulcer.  We discussed options and she elected to proceed with emergent repair.  Patient was given Zosyn and Protonix in the ER.    PLAN:  Laparoscopic repair of perforated gastric ulcer  Postoperative plan  -NG tube to LIS for 5 days with follow-up upper GI to evaluate for leak prior to initiating clear liquid diet  -Strict n.p.o.  -ALEX drain to bulb suction  -Do not manipulate NG tube  -We will start Lovenox tomorrow  -Continue Bates catheter until postoperative day #1  -Continue Zosyn  -Had ordered a fluconazole given perforated gastric ulcer however interaction noted with BuSpar and discontinued this at         Requesting Physician:              Chief Complaint:     Chief Complaint   Patient presents with     Chest Pain          History of Present Illness:   Silvia Yoon is a 57 year old female who presented with severe abdominal and chest pain.  A chest x-ray was obtained and revealed intra-abdominal free air and was therefore followed up with an abdominal CT which revealed concern for perforated viscus with free air and free fluid.  Per chart review Silvia has been feeling unwell for a few days and had a severe worsening of pain with chest and abdominal pain last night.  Pain was radiating to her left shoulder and is worse with deep inspiration.  Patient has not had any prior abdominal surgery          Physical Exam:   Blood pressure 120/77, pulse 99, temperature 98.5  F (36.9  C), temperature source Oral, resp. rate 24, height 1.626 m (5' 4\"), weight (!) 158.8 kg (350 lb), SpO2 94 %.  350 lbs 0 oz  General: Lying in bed, appears comfortable  Psych: Alert and Oriented.  Normal affect  Neurological: grossly intact  Eyes: Sclera " clear  Respiratory:  nonlabored breathing  Cardiovascular:  Regular Rate and Rhythm   GI: Obese, soft tender to palpation with rebound and guarding consistent with peritonitis  Lymphatic/Hematologic/Immune:  No femoral or cervical lymphadenopathy.  Integumentary:  No rashes         Past Medical History:     Past Medical History:   Diagnosis Date     Gastroesophageal reflux disease with esophagitis      Hypertension      Sleep apnea             Past Surgical History:     Past Surgical History:   Procedure Laterality Date     LAPAROSCOPY DIAGNOSTIC (GENERAL) N/A 4/13/2023    Procedure: Diagnostic laparoscopy with repair of perforated ulcer,;  Surgeon: Edilma Navarrete MD;  Location:  OR            Current Medications:           [START ON 4/14/2023] enoxaparin ANTICOAGULANT  40 mg Subcutaneous Q12H     pantoprazole  40 mg Intravenous BID     piperacillin-tazobactam  3.375 g Intravenous Q6H     sodium chloride (PF)  3 mL Intracatheter Q8H       acetaminophen, sore throat, diazepam, hydrALAZINE, HYDROmorphone **OR** HYDROmorphone, lidocaine 4%, lidocaine (buffered or not buffered), naloxone **OR** naloxone **OR** naloxone **OR** naloxone, ondansetron **OR** ondansetron, phenol, prochlorperazine **OR** [DISCONTINUED] prochlorperazine, sodium chloride (PF)         Home Medications:     Prior to Admission medications    Medication Sig Last Dose Taking? Auth Provider Long Term End Date   amLODIPine (NORVASC) 5 MG tablet Take 5 mg by mouth daily  Yes Unknown, Entered By History Yes    busPIRone HCl (BUSPAR) 30 MG tablet Take 30 mg by mouth  Yes Unknown, Entered By History Yes    gabapentin (NEURONTIN) 300 MG capsule Take 300 mg by mouth  Yes Unknown, Entered By History Yes    hydrochlorothiazide (HYDRODIURIL) 25 MG tablet Take 25 mg by mouth daily  Yes Unknown, Entered By History Yes    losartan (COZAAR) 100 MG tablet Take 100 mg by mouth daily  Yes Unknown, Entered By History Yes             Allergies:     Allergies    Allergen Reactions     Naproxen Unknown     Club feet and burning ankles.            Family History:   No family history on file.        Social History:   Silvia Yoon            Review of Systems:   The 10 point Review of Systems is negative other than noted in the HPI.         Labs/Imaging   All new lab and imaging data was reviewed.   I have personally reviewed the imaging studies including her CT        Edilma Navarrete MD

## 2023-04-13 NOTE — CONSULTS
[facility]  Consult Note - Hospitalist Service  Date of Admission:  4/13/2023  Consult Requested by: Shayy Pickard PA-C  Reason for Consult: Post-op medical co-mnanagement    Assessment & Plan   Silvia Yoon is a 57 year old female with below PMHx admitted on 4/13/2023. She presented with abdominal pain, nausea, and poor oral intake X1 week, found to have a perforated ulcer on CT, taken emergently to surgery by General Surgery. Hospitalist service was consulted for medical co-management.     S/P diagnostic laparoscopy with repair of perforated ulcer (4/13/23)  Hx of ulcer, H. Pylori: Procedure per Dr. Navarrete.   * WBC 11.5, CT A/P with free air in the upper abdomen anteriorly with stranding in the region of the stomach and duodenum. There is an air-fluid collection posterior to the stomach containing some high attenuation fluid posteriorly. Findings are concerning for perforated   ulcer. There is some free fluid adjacent to the spleen. Moderate free fluid in the pelvic cul-de-sac.   -- Defer routine post-operative cares, IVF, DVT prophylaxis and pain control to primary service   -- Defer drain and NG management to primary team   -- DVT prophylaxis with lovenox 40 mg BID   -- Zosyn and Diflucan IV for bacterial and fungal infection prophylaxis per primary team  -- IVF with NS + KCl 20 mEq/L at 125 mL/hr   -- IV Protonix 40 mg BID   -- Encourage pulmonary toilet; incentive spirometer at bedside   -- Bowel regimen in place while on narcotics   -- Strict NPO     Severe hypokalemia: K 2.7 on admission. Repleted per protocol on admission.   - Recheck K and add Mg now   - Electrolyte replacement protocol in place     VANDA: Creat 1.24 on admission. Baseline 0.7-0.8.   - IVF as above   - Repeat BMP in the AM   - Avoid nephrotoxic agents     Leukocytosis: 11.5 on admission. Reactive in the setting of above. Afebrile.   - IV antibiotics as above   - Repeat CBC in the AM     Hypertension: Hold all  "PTA antihypertensives given strict NPO status. Awaiting pharmacy med rec. Vitals well controlled off.     Anxiety  Depression  Insomnia: Hold PTA regimen given strict NPO status.      Hx of melanoma: Noted on back years ago s/p resection and no further treatment. Gets annual surveillance.     MATI: CPAP with home settings ordered    Severe Obesity: Estimated body mass index is 60.08 kg/m  as calculated from the following:    Height as of this encounter: 1.626 m (5' 4\").    Weight as of this encounter: 158.8 kg (350 lb).         The patient's care was discussed with the Attending Physician, Dr. Rivera and Patient.    Dulce Maria John PA-C  Hospitalist Service  Securely message with [employer] (more info)  Text page via [facility] Paging/Directory   ______________________________________________________________________    Chief Complaint   Perforated ulcer    History is obtained from the patient and chart review.     History of Present Illness   Silvia Yoon is a 57 year old female with below PMHx admitted on 4/13/2023. She presented with abdominal pain, nausea, and poor oral intake X1 week, found to have a perforated ulcer on CT, taken emergently to surgery by General Surgery. Hospitalist service was consulted for medical co-management.     Reports one week of worsening epigastric pain radiating down to hips, nausea, poor PO intake. Hx of ulcer and H. Pylori. Denies recent melena or hematochezia. No chronic NSAID use. No alcohol use. Seen post-procedure. Pain is well managed. NG tube and ALEX drain in place. No nausea. Strict NPO.     Past Medical History    Past Medical History:   Diagnosis Date     Gastroesophageal reflux disease with esophagitis      Hypertension      Sleep apnea        Past Surgical History   Past Surgical History:   Procedure Laterality Date     LAPAROSCOPY DIAGNOSTIC (GENERAL) N/A 4/13/2023    Procedure: Diagnostic laparoscopy with repair of perforated ulcer,;  Surgeon: Landon, " MD Edilma;  Location:  OR       Medications   Medications Prior to Admission   Medication Sig Dispense Refill Last Dose     amLODIPine (NORVASC) 5 MG tablet Take 5 mg by mouth daily        busPIRone HCl (BUSPAR) 30 MG tablet Take 30 mg by mouth        gabapentin (NEURONTIN) 300 MG capsule Take 300 mg by mouth        hydrochlorothiazide (HYDRODIURIL) 25 MG tablet Take 25 mg by mouth daily        losartan (COZAAR) 100 MG tablet Take 100 mg by mouth daily             Review of Systems    The 10 point Review of Systems is negative other than noted in the HPI.    Social History   No alcohol or tobacco use. Stressful job working at the [employer] access [employer].     Allergies   Allergies   Allergen Reactions     Naproxen Unknown     Club feet and burning ankles.      Physical Exam   Vital Signs: Temp: 98.5  F (36.9  C) Temp src: Oral BP: 120/77 Pulse: 99   Resp: 24 SpO2: 94 % O2 Device: Nasal cannula Oxygen Delivery: 3 LPM  Weight: 350 lbs 0 oz    CONSTITUTIONAL: Pt laying in bed, dressed in hospital garb. Appears comfortable. Cooperative with interview.   HEENT: Normocephalic, atraumatic.   CARDIOVASCULAR: RRR, no murmurs, rubs, or extra heart sounds appreciated. Pulses +2/4 and regular in upper and lower extremities, bilaterally.   RESPIRATORY: No increased work of breathing.  CTA, bilat; no wheezes, rales, or rhonchi appreciated.  GASTROINTESTINAL:  Abdomen soft, non-distended. BS hypoactive. Negative for tenderness to palpation.  No masses or organomegaly noted.  MUSCULOSKELETAL: No gross deformities noted. Normal muscle tone.   HEMATOLOGIC/LYMPHATIC/IMMUNOLOGIC: Negative for lower extremity edema, bilaterally.  NEUROLOGIC: Alert and oriented to person, place, and time. No focal neuro deficits.    SKIN: Warm, dry, intact. NG in right nare. ALEX drain with minimal serous output. Lap incision C/D/I.     Medical Decision Making       50 MINUTES SPENT BY ME on the date of service doing chart review, history, exam, documentation & further  activities per the note.      Data     I have personally reviewed the following data over the past 24 hrs:    11.5 (H)  \   13.2   / 366     137 96 (L) 16.0 /  155 (H)   2.7 (L) 30 (H) 1.24 (H) \       ALT: 16 AST: 18 AP: 56 TBILI: 0.4   ALB: 3.7 TOT PROTEIN: 6.7 LIPASE: N/A       Trop: 12 BNP: N/A       Imaging results reviewed over the past 24 hrs:   Recent Results (from the past 24 hour(s))   XR Chest 2 Views    Narrative    EXAM: XR CHEST 2 VIEWS  LOCATION: [facility]  DATE/TIME: 4/13/2023 4:59 AM CDT    INDICATION: chest pain  COMPARISON: None.      Impression    IMPRESSION: Free air under both hemidiaphragms do not appear to be intraluminal, concerning for pneumoperitoneum. Lungs, costophrenic angles clear. Heart size normal.   CT Abdomen Pelvis w Contrast    Narrative    EXAM: CT ABDOMEN PELVIS W CONTRAST  LOCATION: [facility]  DATE/TIME: 4/13/2023 6:31 AM CDT    INDICATION: abd pain, free air on CXR  COMPARISON: None.  TECHNIQUE: CT scan of the abdomen and pelvis was performed following injection of IV contrast. Multiplanar reformats were obtained. Dose reduction techniques were used.  CONTRAST: 135mL Isovue 370    FINDINGS:   LOWER CHEST: Normal.    HEPATOBILIARY: Normal.    PANCREAS: Normal.    SPLEEN: Normal.    ADRENAL GLANDS: Normal.    KIDNEYS/BLADDER: Normal.    BOWEL: Free air in the upper abdomen anteriorly with stranding in the region of the stomach and duodenum. There is an air-fluid collection posterior to the stomach containing some high attenuation fluid posteriorly. Findings are concerning for perforated   ulcer. There is some free fluid adjacent to the spleen. Moderate free fluid in the pelvic cul-de-sac. Clinical correlation for exudative fluid at the time of surgery. Findings were discussed with the ordering clinician, HERMES MCMULLEN, at approximately   0642 hours. The appendix is seen and appears within normal limits.. Remainder of the  bowel gas pattern is nonspecific.    LYMPH NODES: Normal.    VASCULATURE: Unremarkable.    PELVIC ORGANS: 6 the uterus and bilateral adnexal structures appear within normal limits.    MUSCULOSKELETAL: Moderate lumbar spondylotic changes.      Impression    IMPRESSION:   1.  Free air in the upper abdomen anteriorly with stranding in the region of the stomach and duodenum. There is an air-fluid collection posterior to the stomach containing some high attenuation fluid posteriorly. Findings are concerning for perforated   ulcer. There is some free fluid adjacent to the spleen. Moderate free fluid in the pelvic cul-de-sac. Clinical correlation for exudative fluid at the time of surgery. Findings were discussed with the ordering clinician, HERMES MCMULLEN, at approximately   0642 hours. Remainder of the bowel gas pattern is nonspecific.

## 2023-04-13 NOTE — ANESTHESIA POSTPROCEDURE EVALUATION
Patient: Silvia Yoon    Procedure: Procedure(s):  Diagnostic laparoscopy with repair of perforated ulcer,       Anesthesia Type:  General    Note:  Disposition: Inpatient   Postop Pain Control: Uneventful            Sign Out: Well controlled pain   PONV: No   Neuro/Psych: Uneventful            Sign Out: Acceptable/Baseline neuro status   Airway/Respiratory: Uneventful            Sign Out: Acceptable/Baseline resp. status   CV/Hemodynamics: Uneventful            Sign Out: Acceptable CV status   Other NRE: NONE   DID A NON-ROUTINE EVENT OCCUR? No           Last vitals:  Vitals Value Taken Time   /71 04/13/23 1215   Temp 36.2  C (97.2  F) 04/13/23 1058   Pulse 98 04/13/23 1219   Resp 24 04/13/23 1219   SpO2 92 % 04/13/23 1219   Vitals shown include unvalidated device data.    Electronically Signed By: Celio Alberts MD  April 13, 2023  12:49 PM

## 2023-04-13 NOTE — OP NOTE
General Surgery Operative Note    PREOPERATIVE DIAGNOSIS: perforated viscous    POSTOPERATIVE DIAGNOSIS: perforated gastric ulcer    PROCEDURE:   1. Diagnostic laparoscopy  2. Laparoscopic repair of gastric ulcer    DIFFICULTY: Modifier 22 due to patient's BMI and location of ulcer    SURGEON:  Edilma Navarrete MD    ASSISTANT:  Shayy Pickard PA-C  The PA s assistance was medically necessary to provide adequate exposure in the operating field, maintain hemostasis, cutting suture, clamping and ligating bleeding vessels, and visualization of anatomic structures throughout the surgical procedure.     ANESTHESIA:  General.    BLOOD LOSS: 5ml    FINDINGS: 1 cm prepyloric anterior perforated gastric ulcer repaired with falciform patch    INDICATIONS:   Silvia is a 57-year-old female who presented with abdominal pain and chest pain and chest x-ray revealed free intra-abdominal air.  Follow-up abdominal CT confirmed free intra-abdominal air and revealed thickening of the gastric wall and free fluid.  I discussed with patient that this is likely perforated gastric versus duodenal ulcer and would recommend emergent surgical intervention for repair.  We discussed the risks, benefits, indications and alternatives and she elected to proceed with surgery.    DETAILS OF PROCEDURE: The patient was brought to the operating room per Anesthesia, placed in supine position, and intubated without difficulty. Perioperative antibiotics were administered. The abdomen was prepped and draped in standard fashion. A chauhan catheter was placed using sterile technique.     A Surgical timeout was then performed, verifying the correct surgeon, site, procedure, and patient and all in the room were in agreement.     We began by making a small incision in the left upper quadrant.  A 5 mm 0 degree laparoscope was then used to obtain intra-abdominal access using Visiport technique.  Insufflation was connected and flowed freely.  Insufflation pressure was  set to 15 mmHg which the patient tolerated well.  We then placed a 5 mm 30 degree laparoscope and surveyed the abdomen.  There was a significant amount of free fluid which was purulent in nature in the upper abdomen.    We placed a second 5 mm port just above the umbilicus in the midline and a third in the right upper abdominal wall under direct visualization.  We then positioned the patient in steep reverse Trendelenburg position and evaluated the upper abdomen.  There were fibrinous adhesions from the free edge of the left lobe of the liver to the stomach which were carefully taken down using the suction irrigation device.  There is a significant amount of purulent fluid across the upper abdomen which was all aspirated.  We continue evaluating the anterior aspect of the stomach and identified a 1 cm prepyloric perforation.  The remainder of the anterior and first portion of the duodenum was normal.  The tissue surrounding the perforation was extremely indurated and.  We placed another 5 mm port in the left mid abdomen to allow for a another working port.  We then placed a Armando liver retractor via a small incision epigastrium and positioned the retractor to hold the liver superiorly.  With this we were able to more clearly see this 1 cm perforation.  I placed two 2-0 Vicryl sutures to close the perforation and unfortunately when the sutures down the tissue was so indurated and did not hold.  I placed another 2-0 Vicryl suture aching larger bites medial and lateral to the defect and tied this down with some approximation.  We then elected to proceed with an omental patch.  The greater omentum was very large and heavy and did not mobilize easily from the lower abdomen.  We then used the LigaSure device to mobilize the falciform ligament to use this as a patch.  We mobilized the falciform and then secured it with 2-0 Vicryl sutures across the defect with excellent coverage.  We then had our anesthesia team placed  a nasogastric tube and we used fluoroscopy to confirm this was in a good position in the stomach.  We irrigated the abdomen with several liters of warm sterile saline until it was clear.  We then placed 10 mL of EviSeal over the area of prior perforation and around the falciform patch.  We placed a 19 Telugu round ALEX drain via our right lateral port site and positioned just anterior to the stomach in the left upper quadrant.  This was secured to the skin using a 3-0 nylon suture.  We then removed all of our 5 mm ports under direct visualization.  All incisions were closed with 4-0 Monocryl sutures.  The patient tolerated the procedure well and was taken to PACU in stable condition.  All instrument, needle and sponge counts were correct at the conclusion of the procedure.    Edilma Navarrete MD

## 2023-04-13 NOTE — ED TRIAGE NOTES
Patient here with chest pain which is radiating to he left shoulder. She also c/o shortness of breath . Her symptoms started tonight     Triage Assessment     Row Name 04/13/23 0202       Triage Assessment (Adult)    Airway WDL WDL       Respiratory WDL    Respiratory WDL WDL       Skin Circulation/Temperature WDL    Skin Circulation/Temperature WDL WDL       Cardiac WDL    Cardiac WDL WDL       Peripheral/Neurovascular WDL    Peripheral Neurovascular WDL WDL       Cognitive/Neuro/Behavioral WDL    Cognitive/Neuro/Behavioral WDL WDL

## 2023-04-13 NOTE — OR NURSING
Pt alert and orient, talked with jose l brother in law. Family updated with plan of care. Receiving texts messages. Pt to surgery

## 2023-04-13 NOTE — PLAN OF CARE
Goal Outcome Evaluation:    POD#0 laparoscopy w/ repair of perforated ulcer. A&Ox4, very pleasant. VSS on 3L. Uses cpap overnight. Capno WNL. C/o moderate incisional pain, controlled with IV dilaudid & ice to incision. NPO, strict no meds or ice, oral swabs provided. NG to LIS, no output this shift. 4 abd lap sites, dressed & CDI. ALEX to RLQ, moderate serous drainage. Bates patent, good UOP. Up w/ A2 to stand & bedside & took some steps in room. Encouraged to reposition self in bed. PCD's on. Discharge pending recovery.

## 2023-04-13 NOTE — ED NOTES
Patient lying supine on cart. On ECG NIBP and SaO2 monitoring. Patient using phone to update family.    RN introduced self to patient.    Patient is awake and alert.    Oriented to person, place, time and situation.    Airway is patent.    Respirations are regular and unlabored.  Patient talking in full sentences.  Patient denies cough or shortness of breath.    Pulses are strong and regular with palpation.  Skin is normal color, warm and dry.   Cap refill is less than 3 seconds.  Patient denies chest pain/pressure.    Patient denies nausea.     Patient complaints of upper abdominal pain. Rates at 5-6 /10.     Patient medicated as ordered.    Patient placed on supplemental oxygen for low SaO2 readings.    Patient updated on continued POC and waits.    Continue to monitor.

## 2023-04-14 LAB
ANION GAP SERPL CALCULATED.3IONS-SCNC: 10 MMOL/L (ref 7–15)
BUN SERPL-MCNC: 15.5 MG/DL (ref 6–20)
CALCIUM SERPL-MCNC: 8.7 MG/DL (ref 8.6–10)
CHLORIDE SERPL-SCNC: 102 MMOL/L (ref 98–107)
CREAT SERPL-MCNC: 1.21 MG/DL (ref 0.51–0.95)
DEPRECATED HCO3 PLAS-SCNC: 29 MMOL/L (ref 22–29)
ERYTHROCYTE [DISTWIDTH] IN BLOOD BY AUTOMATED COUNT: 14.9 % (ref 10–15)
GFR SERPL CREATININE-BSD FRML MDRD: 52 ML/MIN/1.73M2
GLUCOSE BLDC GLUCOMTR-MCNC: 103 MG/DL (ref 70–99)
GLUCOSE SERPL-MCNC: 114 MG/DL (ref 70–99)
HCT VFR BLD AUTO: 36.2 % (ref 35–47)
HGB BLD-MCNC: 11.7 G/DL (ref 11.7–15.7)
MAGNESIUM SERPL-MCNC: 2 MG/DL (ref 1.7–2.3)
MCH RBC QN AUTO: 31.3 PG (ref 26.5–33)
MCHC RBC AUTO-ENTMCNC: 32.3 G/DL (ref 31.5–36.5)
MCV RBC AUTO: 97 FL (ref 78–100)
PLATELET # BLD AUTO: 307 10E3/UL (ref 150–450)
POTASSIUM SERPL-SCNC: 4.3 MMOL/L (ref 3.4–5.3)
RBC # BLD AUTO: 3.74 10E6/UL (ref 3.8–5.2)
SODIUM SERPL-SCNC: 141 MMOL/L (ref 136–145)
WBC # BLD AUTO: 13.2 10E3/UL (ref 4–11)

## 2023-04-14 PROCEDURE — 250N000011 HC RX IP 250 OP 636: Performed by: PHYSICIAN ASSISTANT

## 2023-04-14 PROCEDURE — 80048 BASIC METABOLIC PNL TOTAL CA: CPT | Performed by: PHYSICIAN ASSISTANT

## 2023-04-14 PROCEDURE — 99232 SBSQ HOSP IP/OBS MODERATE 35: CPT | Performed by: HOSPITALIST

## 2023-04-14 PROCEDURE — 85014 HEMATOCRIT: CPT | Performed by: PHYSICIAN ASSISTANT

## 2023-04-14 PROCEDURE — 36415 COLL VENOUS BLD VENIPUNCTURE: CPT | Performed by: PHYSICIAN ASSISTANT

## 2023-04-14 PROCEDURE — C9113 INJ PANTOPRAZOLE SODIUM, VIA: HCPCS | Performed by: PHYSICIAN ASSISTANT

## 2023-04-14 PROCEDURE — 83735 ASSAY OF MAGNESIUM: CPT | Performed by: PHYSICIAN ASSISTANT

## 2023-04-14 PROCEDURE — 120N000001 HC R&B MED SURG/OB

## 2023-04-14 RX ORDER — ALBUTEROL SULFATE 90 UG/1
1-2 AEROSOL, METERED RESPIRATORY (INHALATION) EVERY 6 HOURS PRN
COMMUNITY

## 2023-04-14 RX ORDER — ACETAMINOPHEN 500 MG
1000 TABLET ORAL 3 TIMES DAILY
COMMUNITY

## 2023-04-14 RX ORDER — VITAMIN B COMPLEX
25 TABLET ORAL DAILY
COMMUNITY

## 2023-04-14 RX ORDER — FLUCONAZOLE 2 MG/ML
400 INJECTION, SOLUTION INTRAVENOUS EVERY 24 HOURS
Status: DISCONTINUED | OUTPATIENT
Start: 2023-04-14 | End: 2023-04-19 | Stop reason: HOSPADM

## 2023-04-14 RX ORDER — MELOXICAM 15 MG/1
15 TABLET ORAL DAILY
COMMUNITY

## 2023-04-14 RX ORDER — ALBUTEROL SULFATE 90 UG/1
1-2 AEROSOL, METERED RESPIRATORY (INHALATION) EVERY 6 HOURS PRN
Status: DISCONTINUED | OUTPATIENT
Start: 2023-04-14 | End: 2023-04-19 | Stop reason: HOSPADM

## 2023-04-14 RX ORDER — TRAZODONE HYDROCHLORIDE 50 MG/1
75 TABLET, FILM COATED ORAL AT BEDTIME
COMMUNITY

## 2023-04-14 RX ORDER — DULOXETIN HYDROCHLORIDE 60 MG/1
60 CAPSULE, DELAYED RELEASE ORAL DAILY
COMMUNITY

## 2023-04-14 RX ADMIN — ENOXAPARIN SODIUM 40 MG: 40 INJECTION SUBCUTANEOUS at 20:54

## 2023-04-14 RX ADMIN — FLUCONAZOLE 400 MG: 400 INJECTION, SOLUTION INTRAVENOUS at 12:54

## 2023-04-14 RX ADMIN — HYDROMORPHONE HYDROCHLORIDE 0.4 MG: 0.2 INJECTION, SOLUTION INTRAMUSCULAR; INTRAVENOUS; SUBCUTANEOUS at 02:19

## 2023-04-14 RX ADMIN — HYDROMORPHONE HYDROCHLORIDE 0.2 MG: 0.2 INJECTION, SOLUTION INTRAMUSCULAR; INTRAVENOUS; SUBCUTANEOUS at 09:07

## 2023-04-14 RX ADMIN — POTASSIUM CHLORIDE AND SODIUM CHLORIDE: 900; 150 INJECTION, SOLUTION INTRAVENOUS at 06:27

## 2023-04-14 RX ADMIN — PIPERACILLIN AND TAZOBACTAM 3.38 G: 3; .375 INJECTION, POWDER, FOR SOLUTION INTRAVENOUS at 06:27

## 2023-04-14 RX ADMIN — HYDROMORPHONE HYDROCHLORIDE 0.4 MG: 0.2 INJECTION, SOLUTION INTRAMUSCULAR; INTRAVENOUS; SUBCUTANEOUS at 00:12

## 2023-04-14 RX ADMIN — HYDROMORPHONE HYDROCHLORIDE 0.4 MG: 0.2 INJECTION, SOLUTION INTRAMUSCULAR; INTRAVENOUS; SUBCUTANEOUS at 04:23

## 2023-04-14 RX ADMIN — PIPERACILLIN AND TAZOBACTAM 3.38 G: 3; .375 INJECTION, POWDER, FOR SOLUTION INTRAVENOUS at 00:12

## 2023-04-14 RX ADMIN — PIPERACILLIN AND TAZOBACTAM 3.38 G: 3; .375 INJECTION, POWDER, FOR SOLUTION INTRAVENOUS at 20:54

## 2023-04-14 RX ADMIN — HYDROMORPHONE HYDROCHLORIDE 0.4 MG: 0.2 INJECTION, SOLUTION INTRAMUSCULAR; INTRAVENOUS; SUBCUTANEOUS at 20:54

## 2023-04-14 RX ADMIN — ONDANSETRON 4 MG: 2 INJECTION INTRAMUSCULAR; INTRAVENOUS at 11:29

## 2023-04-14 RX ADMIN — HYDROMORPHONE HYDROCHLORIDE 0.4 MG: 0.2 INJECTION, SOLUTION INTRAMUSCULAR; INTRAVENOUS; SUBCUTANEOUS at 18:30

## 2023-04-14 RX ADMIN — ENOXAPARIN SODIUM 40 MG: 40 INJECTION SUBCUTANEOUS at 09:06

## 2023-04-14 RX ADMIN — HYDROMORPHONE HYDROCHLORIDE 0.4 MG: 0.2 INJECTION, SOLUTION INTRAMUSCULAR; INTRAVENOUS; SUBCUTANEOUS at 13:07

## 2023-04-14 RX ADMIN — HYDROMORPHONE HYDROCHLORIDE 0.4 MG: 0.2 INJECTION, SOLUTION INTRAMUSCULAR; INTRAVENOUS; SUBCUTANEOUS at 11:25

## 2023-04-14 RX ADMIN — HYDROMORPHONE HYDROCHLORIDE 0.4 MG: 0.2 INJECTION, SOLUTION INTRAMUSCULAR; INTRAVENOUS; SUBCUTANEOUS at 06:27

## 2023-04-14 RX ADMIN — PANTOPRAZOLE SODIUM 40 MG: 40 INJECTION, POWDER, FOR SOLUTION INTRAVENOUS at 09:06

## 2023-04-14 RX ADMIN — HYDROMORPHONE HYDROCHLORIDE 0.4 MG: 0.2 INJECTION, SOLUTION INTRAMUSCULAR; INTRAVENOUS; SUBCUTANEOUS at 15:56

## 2023-04-14 RX ADMIN — PANTOPRAZOLE SODIUM 40 MG: 40 INJECTION, POWDER, FOR SOLUTION INTRAVENOUS at 20:54

## 2023-04-14 RX ADMIN — POTASSIUM CHLORIDE AND SODIUM CHLORIDE: 900; 150 INJECTION, SOLUTION INTRAVENOUS at 17:39

## 2023-04-14 RX ADMIN — DIAZEPAM 2.5 MG: 5 INJECTION INTRAMUSCULAR; INTRAVENOUS at 02:52

## 2023-04-14 RX ADMIN — PIPERACILLIN AND TAZOBACTAM 3.38 G: 3; .375 INJECTION, POWDER, FOR SOLUTION INTRAVENOUS at 14:56

## 2023-04-14 ASSESSMENT — ACTIVITIES OF DAILY LIVING (ADL)
ADLS_ACUITY_SCORE: 39

## 2023-04-14 NOTE — PROGRESS NOTES
SPIRITUAL HEALTH SERVICES Progress Note  [facility] General Surgery    Saw pt Silvia Yoon per RN referral. Introduced myself and Spiritual Health Services. Pt declined a visit at this time, but requested a follow-up visit on Monday (4/17).     Spiritual Health Services remain available.     NATACHA PhamDiv  Chaplain Resident   [phone]

## 2023-04-14 NOTE — PLAN OF CARE
Goal Outcome Evaluation: POD #0 lap repair perforated ulcer. Pt A/O x4, appears anxious, but denies. Pain significant; managed with IV Dilaudid. Strict NPO; swabs only. NG to LIS w/ bilious output. x4 lap sites, ALEX drain with serous output. Adequate UOP, passing flatus, No BM. Up A2+w+GB for ambulation, SOB/WALLIS 3L NC to maintain sats >90%. Uses CPAP at [facility], however, due to NG,unable to maintain a good seal. Plan for O2 at [facility]. Will be here 5 day sat least with LIS- p-tara for discharge next Wednesday?

## 2023-04-14 NOTE — PROGRESS NOTES
[facility]    Medicine Progress Note - Hospitalist Service    Date of Admission:  4/13/2023    Assessment & Plan   Silvia Yoon is a 57 year old female with below PMHx admitted on 4/13/2023. She presented with abdominal pain, nausea, and poor oral intake X1 week, found to have a perforated ulcer on CT, taken emergently to surgery by General Surgery. Hospitalist service was consulted for medical co-management.      S/P diagnostic laparoscopy with repair of perforated ulcer (4/13/23)  Hx of ulcer, H. Pylori  Procedure per Dr. Navarrete.   * WBC 11.5, CT A/P with free air in the upper abdomen anteriorly with stranding in the region of the stomach and duodenum. There is an air-fluid collection posterior to the stomach containing some high attenuation fluid posteriorly. Findings are concerning for perforated   ulcer. There is some free fluid adjacent to the spleen. Moderate free fluid in the pelvic cul-de-sac.   -- Defer routine post-operative cares, IVF, DVT prophylaxis and pain control to primary service   -- Defer drain and NG management to primary team   -- Pain management with IV dilaudid, valium at this time  -- DVT prophylaxis with lovenox 40 mg BID   -- Zosyn and fluconazole for infection prophylaxis x5 days  -- IVF with NS + KCl 20 mEq/L at 125 mL/hr   -- IV Protonix 40 mg BID   -- plan for Upper GI on Tuesday 4/18  -- Encourage IS and ambulation  -- Bowel regimen in place while on narcotics   -- Strict NPO   -- eventual endoscopy in 6-8weeks post op     Severe hypokalemia  K 2.7 on admission. Repleted per protocol on admission.   - Electrolyte replacement protocol in place      VANDA  Creat 1.24 on admission. Baseline 0.7-0.8.   - IVF as above   - trend BMP     Leukocytosis  11.5 on admission. Reactive in the setting of above. Afebrile.   - IV antibiotics as above   - trend CBC     Hypertension  Hold all PTA antihypertensives given NPO (amlodipine 5mg daily, hydrochlorothiazide 25mg  "daily, losartan 100mg daily)  - PRN hydralazine IV for SBP>160     Anxiety  Depression  Insomnia  PTA cymbalta 60mg daily, gabapentin 600mg BID, trazodone 75mg at bedtime)     Hx of melanoma  Noted on back years ago s/p resection and no further treatment. Gets annual surveillance.      MATI  CPAP with home settings ordered    Severe Obesity  Estimated body mass index is 60.08 kg/m  as calculated from the following:    Height as of this encounter: 1.626 m (5' 4\").    Weight as of this encounter: 158.8 kg (350 lb).         Diet: NPO for Medical/Clinical Reasons Except for: No Exceptions, Other; Specify: no oral meds    DVT Prophylaxis: Enoxaparin (Lovenox) SQ  Bates Catheter: Not present  Lines: None     Cardiac Monitoring: None  Code Status: Full Code      Clinically Significant Risk Factors        # Hypokalemia: Lowest K = 2.7 mmol/L in last 2 days, will replace as needed                  # Severe Obesity: Estimated body mass index is 60.08 kg/m  as calculated from the following:    Height as of this encounter: 1.626 m (5' 4\").    Weight as of this encounter: 158.8 kg (350 lb)., PRESENT ON ADMISSION         Disposition Plan      Expected Discharge Date: 04/18/2023, 12:15 PM    Destination: home            Haley Collins DO  Hospitalist Service  [facility]  Securely message with [employer] (more info)  Text page via [facility] Paging/Directory   ______________________________________________________________________    Interval History   Patient seen and examined. She was having pain when I saw her earlier this morning. Discussed importance of trialing different medications, doses today to see what works for her. Cold wash cloths have helped. She has passed a void trial, is passing gas and has ambulated. Seems to be in good spirits, family at bedside. Discussed with RN and Dr Navarrete    Physical Exam   Vital Signs: Temp: 99.2  F (37.3  C) Temp src: Oral BP: (!) 157/88 Pulse: 96   Resp: 18 SpO2: 95 " % O2 Device: Nasal cannula Oxygen Delivery: 2 LPM  Weight: 350 lbs 0 oz     Constitutional: Awake, alert, cooperative, no apparent distress  Respiratory: Clear to auscultation bilaterally, no crackles or wheezing  Cardiovascular: Regular rate and rhythm, normal S1 and S2, and no murmur noted  GI: Normal bowel sounds, soft, non-distended, non-tender. ALEX drain intact with serous drainage. Incisions noted with steri strips.  Skin/Integumen: No rashes, no cyanosis, no edema  Other:     Medical Decision Making       35 MINUTES SPENT BY ME on the date of service doing chart review, history, exam, documentation & further activities per the note.          Data     I have personally reviewed the following data over the past 24 hrs:    13.2 (H)  \   11.7   / 307     141 102 15.5 /  114 (H)   4.3 29 1.21 (H) \       Imaging results reviewed over the past 24 hrs:   No results found for this or any previous visit (from the past 24 hour(s)).

## 2023-04-14 NOTE — PROGRESS NOTES
"[facility]  GENERAL SURGERY Progress Note    Admission Date: 2023         Assessment and Plan:     Silvia Yoon is a 57 year old female s/p laparoscopic perforated gastric ulcer, POD 1  - Strict NPO, continue NGT (Do not manipulate or irrigate)  - Plan for UGI POD 5  - Pain control- Tylenol supp and Dilaudid and Valium PRN  - WBC 11.5-->13.5, on Zosyn and Diflucan IV, CBC   - Continue ALEX  - Hgb 11.7, start Lovenox  - Continue Protonix bid  - Creat 1.24-->1.21, continue to monitor, no toradol  - Ambulate 4x day and encourage IS  - Med management per hospitalist, much appreciate your assistance  - Will need EGD in 6 weeks             Interval History:     Hypertensive, sore- discussed prn option, pain controlled, UO adequate, chauhan removed at 7am due to void, ambulating.                     Physical Exam:   Blood pressure (!) 157/88, pulse 96, temperature 99.2  F (37.3  C), temperature source Oral, resp. rate 18, height 1.626 m (5' 4\"), weight (!) 158.8 kg (350 lb), SpO2 95 %.  Temperature Temp  Av.7  F (37.1  C)  Min: 97.2  F (36.2  C)  Max: 99.7  F (37.6  C)   I/O last 3 completed shifts:  In: 3259 [I.V.:3259]  Out: 1120 [Urine:875; Emesis/NG output:100; Drains:140; Blood:5]  Constitutional:  Awake and in no apparent distress.   Lungs: No increased work of breathing.   Cardiovascular: Regular rate and rhythm   Abdomen: Soft, non-distended, appropriately tender at incision(s). ALEX drain intact, serous.   Wound(s): Clean, dry, and intact. No erythema or drainage.    Extremities: No edema or calf tenderness. +SCDs          Data:      Recent Labs   Lab Test 23  0734 23  0224   WBC 13.2* 11.5*   HGB 11.7 13.2   HCT 36.2 39.1    366      Recent Labs   Lab Test 23  0734 23  1532 23  0224     --  137   POTASSIUM 4.3 3.7 2.7*   CHLORIDE 102  --  96*   CO2 29  --  30*   BUN 15.5  --  16.0   CR 1.21* 1.24* 1.24*     Shayy Pickard, " ARMANDO  [facility]  [phone]

## 2023-04-14 NOTE — PLAN OF CARE
Goal Outcome Evaluation:      Plan of Care Reviewed With: patient    Overall Patient Progress: no changeOverall Patient Progress: no change    POD1 laparoscopy w/ repair of perforated ulcer.   AOx4, VSS on 3L. Wears CPAP at night, however did not bring home machine and RT cautioned against using hospital machine first night of surgery. Pt in agreement to wait until tonight to use own home machine. C/o moderate incisional pain, controlled with IV dilaudid & ice to incision. Pt having some anxiety overnight, IV valium given with good effect. Capno WDL. NPO, strict no meds or ice, oral swabs provided. NG to LIS, 100cc bile. 4 abd lap sites, dressed & CDI. ALEX to RLQ, 30cc serous drainage. Bates with good UOP, slightly cloudy. Up w/ A2 GBW. Encouraged to reposition self in bed. PCD's on.

## 2023-04-14 NOTE — PHARMACY-ADMISSION MEDICATION HISTORY
Pharmacist Admission Medication History    Admission medication history is complete. The information provided in this note is only as accurate as the sources available at the time of the update.    Medication reconciliation/reorder completed by provider prior to medication history? Yes    Information Source(s): Patient and pt's med list on phone via in-person    Pertinent Information: Stopped Buspirone 3 mos ago because pt didn't feel it was helping    Changes made to PTA medication list:    Added: Albuterol, Duloxetine, Trazodone, Tylenol, Meloxicam, Vit D    Deleted: Buspirone    Changed: Gabapentin    Medication Affordability:  Not including over the counter (OTC) medications, was there a time in the past 12 months when you did not take your medications as prescribed because of cost?: No    Allergies reviewed with patient and updates made in EHR: yes    Medication History Completed By: Karen Moreno RPH 4/14/2023 9:10 AM    PTA Med List   Medication Sig Last Dose     acetaminophen (TYLENOL) 500 MG tablet Take 1,000 mg by mouth 3 times daily 4/12/2023     albuterol (PROAIR HFA/PROVENTIL HFA/VENTOLIN HFA) 108 (90 Base) MCG/ACT inhaler Inhale 1-2 puffs into the lungs every 6 hours as needed for shortness of breath, wheezing or cough prn     amLODIPine (NORVASC) 5 MG tablet Take 5 mg by mouth daily 4/12/2023     DULoxetine (CYMBALTA) 60 MG capsule Take 60 mg by mouth daily 4/12/2023     gabapentin (NEURONTIN) 300 MG capsule Take 600 mg by mouth 2 times daily 4/12/2023     hydrochlorothiazide (HYDRODIURIL) 25 MG tablet Take 25 mg by mouth daily 4/12/2023     losartan (COZAAR) 100 MG tablet Take 100 mg by mouth daily 4/12/2023     meloxicam (MOBIC) 15 MG tablet Take 15 mg by mouth daily 4/12/2023     traZODone (DESYREL) 50 MG tablet Take 75 mg by mouth At Bedtime 4/12/2023     Vitamin D3 (CHOLECALCIFEROL) 25 mcg (1000 units) tablet Take 25 mcg by mouth daily 4/12/2023

## 2023-04-15 LAB
ANION GAP SERPL CALCULATED.3IONS-SCNC: 9 MMOL/L (ref 7–15)
BUN SERPL-MCNC: 14.6 MG/DL (ref 6–20)
CALCIUM SERPL-MCNC: 8.1 MG/DL (ref 8.6–10)
CHLORIDE SERPL-SCNC: 110 MMOL/L (ref 98–107)
CREAT SERPL-MCNC: 1.08 MG/DL (ref 0.51–0.95)
DEPRECATED HCO3 PLAS-SCNC: 24 MMOL/L (ref 22–29)
ERYTHROCYTE [DISTWIDTH] IN BLOOD BY AUTOMATED COUNT: 15 % (ref 10–15)
GFR SERPL CREATININE-BSD FRML MDRD: 60 ML/MIN/1.73M2
GLUCOSE BLDC GLUCOMTR-MCNC: 92 MG/DL (ref 70–99)
GLUCOSE SERPL-MCNC: 95 MG/DL (ref 70–99)
HCT VFR BLD AUTO: 32.9 % (ref 35–47)
HGB BLD-MCNC: 10.5 G/DL (ref 11.7–15.7)
MAGNESIUM SERPL-MCNC: 2 MG/DL (ref 1.7–2.3)
MCH RBC QN AUTO: 31.5 PG (ref 26.5–33)
MCHC RBC AUTO-ENTMCNC: 31.9 G/DL (ref 31.5–36.5)
MCV RBC AUTO: 99 FL (ref 78–100)
PHOSPHATE SERPL-MCNC: 2.1 MG/DL (ref 2.5–4.5)
PLATELET # BLD AUTO: 300 10E3/UL (ref 150–450)
POTASSIUM SERPL-SCNC: 3.9 MMOL/L (ref 3.4–5.3)
RBC # BLD AUTO: 3.33 10E6/UL (ref 3.8–5.2)
SODIUM SERPL-SCNC: 143 MMOL/L (ref 136–145)
WBC # BLD AUTO: 12.1 10E3/UL (ref 4–11)

## 2023-04-15 PROCEDURE — C9113 INJ PANTOPRAZOLE SODIUM, VIA: HCPCS | Performed by: PHYSICIAN ASSISTANT

## 2023-04-15 PROCEDURE — 99232 SBSQ HOSP IP/OBS MODERATE 35: CPT | Performed by: HOSPITALIST

## 2023-04-15 PROCEDURE — 83735 ASSAY OF MAGNESIUM: CPT | Performed by: HOSPITALIST

## 2023-04-15 PROCEDURE — 82310 ASSAY OF CALCIUM: CPT | Performed by: HOSPITALIST

## 2023-04-15 PROCEDURE — 84100 ASSAY OF PHOSPHORUS: CPT | Performed by: HOSPITALIST

## 2023-04-15 PROCEDURE — 250N000009 HC RX 250: Performed by: HOSPITALIST

## 2023-04-15 PROCEDURE — 999N000127 HC STATISTIC PERIPHERAL IV START W US GUIDANCE

## 2023-04-15 PROCEDURE — 250N000011 HC RX IP 250 OP 636: Performed by: PHYSICIAN ASSISTANT

## 2023-04-15 PROCEDURE — 250N000011 HC RX IP 250 OP 636: Performed by: HOSPITALIST

## 2023-04-15 PROCEDURE — 36415 COLL VENOUS BLD VENIPUNCTURE: CPT | Performed by: HOSPITALIST

## 2023-04-15 PROCEDURE — 120N000001 HC R&B MED SURG/OB

## 2023-04-15 PROCEDURE — 999N000040 HC STATISTIC CONSULT NO CHARGE VASC ACCESS

## 2023-04-15 PROCEDURE — 85027 COMPLETE CBC AUTOMATED: CPT | Performed by: HOSPITALIST

## 2023-04-15 PROCEDURE — 258N000003 HC RX IP 258 OP 636: Performed by: HOSPITALIST

## 2023-04-15 RX ADMIN — ONDANSETRON 4 MG: 2 INJECTION INTRAMUSCULAR; INTRAVENOUS at 15:35

## 2023-04-15 RX ADMIN — HYDROMORPHONE HYDROCHLORIDE 0.4 MG: 0.2 INJECTION, SOLUTION INTRAMUSCULAR; INTRAVENOUS; SUBCUTANEOUS at 00:46

## 2023-04-15 RX ADMIN — ENOXAPARIN SODIUM 40 MG: 40 INJECTION SUBCUTANEOUS at 08:38

## 2023-04-15 RX ADMIN — POTASSIUM CHLORIDE AND SODIUM CHLORIDE: 900; 150 INJECTION, SOLUTION INTRAVENOUS at 02:25

## 2023-04-15 RX ADMIN — HYDRALAZINE HYDROCHLORIDE 10 MG: 20 INJECTION INTRAMUSCULAR; INTRAVENOUS at 16:49

## 2023-04-15 RX ADMIN — ONDANSETRON 4 MG: 2 INJECTION INTRAMUSCULAR; INTRAVENOUS at 05:51

## 2023-04-15 RX ADMIN — PANTOPRAZOLE SODIUM 40 MG: 40 INJECTION, POWDER, FOR SOLUTION INTRAVENOUS at 08:38

## 2023-04-15 RX ADMIN — PANTOPRAZOLE SODIUM 40 MG: 40 INJECTION, POWDER, FOR SOLUTION INTRAVENOUS at 20:54

## 2023-04-15 RX ADMIN — HYDROMORPHONE HYDROCHLORIDE 0.4 MG: 0.2 INJECTION, SOLUTION INTRAMUSCULAR; INTRAVENOUS; SUBCUTANEOUS at 14:26

## 2023-04-15 RX ADMIN — DIAZEPAM 2.5 MG: 5 INJECTION INTRAMUSCULAR; INTRAVENOUS at 11:17

## 2023-04-15 RX ADMIN — PIPERACILLIN AND TAZOBACTAM 3.38 G: 3; .375 INJECTION, POWDER, FOR SOLUTION INTRAVENOUS at 08:38

## 2023-04-15 RX ADMIN — HYDROMORPHONE HYDROCHLORIDE 0.4 MG: 0.2 INJECTION, SOLUTION INTRAMUSCULAR; INTRAVENOUS; SUBCUTANEOUS at 08:38

## 2023-04-15 RX ADMIN — PIPERACILLIN AND TAZOBACTAM 3.38 G: 3; .375 INJECTION, POWDER, FOR SOLUTION INTRAVENOUS at 02:25

## 2023-04-15 RX ADMIN — ENOXAPARIN SODIUM 40 MG: 40 INJECTION SUBCUTANEOUS at 20:53

## 2023-04-15 RX ADMIN — PIPERACILLIN AND TAZOBACTAM 3.38 G: 3; .375 INJECTION, POWDER, FOR SOLUTION INTRAVENOUS at 20:54

## 2023-04-15 RX ADMIN — PIPERACILLIN AND TAZOBACTAM 3.38 G: 3; .375 INJECTION, POWDER, FOR SOLUTION INTRAVENOUS at 15:35

## 2023-04-15 RX ADMIN — POTASSIUM CHLORIDE AND SODIUM CHLORIDE: 900; 150 INJECTION, SOLUTION INTRAVENOUS at 15:36

## 2023-04-15 RX ADMIN — FLUCONAZOLE 400 MG: 400 INJECTION, SOLUTION INTRAVENOUS at 11:17

## 2023-04-15 RX ADMIN — HYDROMORPHONE HYDROCHLORIDE 0.4 MG: 0.2 INJECTION, SOLUTION INTRAMUSCULAR; INTRAVENOUS; SUBCUTANEOUS at 05:31

## 2023-04-15 RX ADMIN — DIAZEPAM 2.5 MG: 5 INJECTION INTRAMUSCULAR; INTRAVENOUS at 17:08

## 2023-04-15 RX ADMIN — SODIUM PHOSPHATE, MONOBASIC, MONOHYDRATE AND SODIUM PHOSPHATE, DIBASIC, ANHYDROUS 15 MMOL: 142; 276 INJECTION, SOLUTION INTRAVENOUS at 16:48

## 2023-04-15 RX ADMIN — PROCHLORPERAZINE EDISYLATE 10 MG: 5 INJECTION INTRAMUSCULAR; INTRAVENOUS at 19:29

## 2023-04-15 ASSESSMENT — ACTIVITIES OF DAILY LIVING (ADL)
ADLS_ACUITY_SCORE: 43
ADLS_ACUITY_SCORE: 39
ADLS_ACUITY_SCORE: 43
ADLS_ACUITY_SCORE: 39
ADLS_ACUITY_SCORE: 39
ADLS_ACUITY_SCORE: 43
ADLS_ACUITY_SCORE: 39

## 2023-04-15 NOTE — PROGRESS NOTES
[facility]    Medicine Progress Note - Hospitalist Service    Date of Admission:  4/13/2023    Assessment & Plan   Silvia Yoon is a 57 year old female with below PMHx admitted on 4/13/2023. She presented with abdominal pain, nausea, and poor oral intake X1 week, found to have a perforated ulcer on CT, taken emergently to surgery by General Surgery. Hospitalist service was consulted for medical co-management.      S/P diagnostic laparoscopy with repair of perforated ulcer (4/13/23)  Hx of ulcer, H. Pylori  Procedure per Dr. Navarrete.   * WBC 11.5, CT A/P with free air in the upper abdomen anteriorly with stranding in the region of the stomach and duodenum. There is an air-fluid collection posterior to the stomach containing some high attenuation fluid posteriorly. Findings are concerning for perforated   ulcer. There is some free fluid adjacent to the spleen. Moderate free fluid in the pelvic cul-de-sac.   -- Defer routine post-operative cares, IVF, DVT prophylaxis and pain control to primary service   -- Defer drain and NG management to primary team   -- Pain management with IV dilaudid, valium  -- DVT prophylaxis with lovenox 40 mg BID   -- Zosyn and fluconazole for infection prophylaxis x5 days  -- IVF with NS + KCl 20 mEq/L at 125 mL/hr   -- IV Protonix 40 mg BID   -- plan for Upper GI on Tuesday 4/18  -- Encourage IS and ambulation  -- Bowel regimen in place while on narcotics   -- Strict NPO   -- eventual endoscopy in 6-8weeks post op     Severe hypokalemia- resolved  Hypophosphatemia  K 2.7 on admission. Phos 2.1  - Electrolyte replacement protocol in place      VANDA- improving  Creat 1.24 on admission. Baseline 0.7-0.8.   - IVF as above   - trend BMP     Leukocytosis  11.5 on admission. Reactive in the setting of above. Afebrile.   - IV antibiotics as above   - WBC improving     Hypertension  Hold all PTA antihypertensives given NPO (amlodipine 5mg daily, hydrochlorothiazide  "25mg daily, losartan 100mg daily)  - PRN hydralazine IV for SBP>160     Anxiety  Depression  Insomnia  PTA cymbalta 60mg daily, gabapentin 600mg BID, trazodone 75mg at bedtime     Hx of melanoma  Noted on back years ago s/p resection and no further treatment. Gets annual surveillance.      MATI  CPAP with home settings ordered    Severe Obesity  Estimated body mass index is 60.08 kg/m  as calculated from the following:    Height as of this encounter: 1.626 m (5' 4\").    Weight as of this encounter: 158.8 kg (350 lb).         Diet: NPO for Medical/Clinical Reasons Except for: No Exceptions, Other; Specify: no oral meds    DVT Prophylaxis: Enoxaparin (Lovenox) SQ  Bates Catheter: Not present  Lines: None     Cardiac Monitoring: None  Code Status: Full Code      Clinically Significant Risk Factors                         # Severe Obesity: Estimated body mass index is 60.08 kg/m  as calculated from the following:    Height as of this encounter: 1.626 m (5' 4\").    Weight as of this encounter: 158.8 kg (350 lb)., PRESENT ON ADMISSION         Disposition Plan      Expected Discharge Date: 04/19/2023,  3:00 PM    Destination: home            Haley Collins DO  Hospitalist Service  [facility]  Securely message with [employer] (more info)  Text page via [employer] Paging/Directory   ______________________________________________________________________    Interval History   Patient seen and examined. Sister at bedside. She has had diarrhea, feels like she is having less output from her NGT. Pain is well controlled with 0.4 dilaudid. Went for a long walk today as well. Has been up to the chair. Looks like she feels better today. Discussed with RN    Physical Exam   Vital Signs: Temp: 98.9  F (37.2  C) Temp src: Oral BP: (!) 152/93 Pulse: 94   Resp: 18 SpO2: 98 % O2 Device: None (Room air) Oxygen Delivery: 3 LPM (Per pt request)  Weight: 350 lbs 0 oz     Constitutional: Awake, alert, cooperative, no apparent " distress  Respiratory: Clear to auscultation bilaterally, no crackles or wheezing  Cardiovascular: Regular rate and rhythm, normal S1 and S2, and no murmur noted  GI: Normal bowel sounds, soft, non-distended, non-tender. ALEX drain intact with serous drainage. Incisions noted with steri strips.  Skin/Integumen: No rashes, no cyanosis, no edema  Other:     Medical Decision Making       35 MINUTES SPENT BY ME on the date of service doing chart review, history, exam, documentation & further activities per the note.          Data     I have personally reviewed the following data over the past 24 hrs:    12.1 (H)  \   10.5 (L)   / 300     143 110 (H) 14.6 /  95   3.9 24 1.08 (H) \       Imaging results reviewed over the past 24 hrs:   No results found for this or any previous visit (from the past 24 hour(s)).

## 2023-04-15 NOTE — PROGRESS NOTES
"[facility]  GENERAL SURGERY Progress Note    Admission Date: 2023  4/15/2023         Assessment and Plan:     Silvia Yoon is a 57 year old female s/p laparoscopic perforated gastric ulcer, POD 2  - Strict NPO, continue NGT (Do not manipulate or irrigate)  - Plan for UGI POD 5 (Tues)  - Pain control- Tylenol supp and Dilaudid and Valium PRN  - WBC 11.5-->13.2 -->12.1, on Zosyn and Diflucan IV, CBC Monday  - Continue ALEX  - Hgb 11.7-->10.5.  Continue Lovenox  - Continue Protonix bid  - Creat 1.24-->1.21 -->1.08. No toradol  - Ambulate 4x day and encourage IS  - Med management per hospitalist, much appreciate your assistance  - Will need EGD in 6 weeks             Interval History:     Hypertensive, pain well controlled, UO adequate, ambulating.                     Physical Exam:   Blood pressure (!) 152/93, pulse 94, temperature 98.9  F (37.2  C), temperature source Oral, resp. rate 18, height 1.626 m (5' 4\"), weight (!) 158.8 kg (350 lb), SpO2 98 %.  Temperature Temp  Av.7  F (37.1  C)  Min: 97.2  F (36.2  C)  Max: 99.7  F (37.6  C)   I/O last 3 completed shifts:  In: 203 [I.V.:203]  Out: 645 [Urine:470; Emesis/NG output:150; Drains:25]  Constitutional:  Awake and in no apparent distress.  Sitting up talking to friend/family   Lungs: No increased work of breathing.   Cardiovascular: Regular rate and rhythm   Abdomen: Soft, non-distended, appropriately tender at incision(s). ALEX drain intact, still serous.   Wound(s): Clean, dry, and intact. No erythema or drainage.    Extremities: No edema or calf tenderness. +SCDs          Data:      Recent Labs   Lab Test 04/15/23  0659 23  0734 23  0224   WBC 12.1* 13.2* 11.5*   HGB 10.5* 11.7 13.2   HCT 32.9* 36.2 39.1    307 366      Recent Labs   Lab Test 04/15/23  0659 23  0734 23  1532 23  0224    141  --  137   POTASSIUM 3.9 4.3 3.7 2.7*   CHLORIDE 110* 102  --  96*   CO2 24 29  --  30*   BUN 14.6 15.5  " --  16.0   CR 1.08* 1.21* 1.24* 1.24*     Henry Suazo PA-C  [facility]  [phone]

## 2023-04-15 NOTE — PROGRESS NOTES
POD2: s/p laparoscopic perforated gastric ulcer  Orientation/Cognitive: A/OX4  Mobility Level/Assist Equipment: A1/G/W  Fall Risk (Y/N): Y  Behavior Concerns: None  Pain Management: PRN IV Valium given w/ relief. Previous shift, pt received IV Dilaudid.   Tele/VS/O2: Elevated BP, PRN Hydralazine given, continues to need o2 @ 3L nc, other VSS on RA.   ABNL Lab/BG: Creat 1.08, trending down, Phos 2.1, being replaced, recheck in am, WBC12.1, hgb 10.5,   Diet: NPO, No Exceptions. Nauseous towards the end of the shift, compazine given.   Bowel/Bladder: BS, faint. +Flatus. Loose BM today.   Skin Concerns: Lap sites w/steri-strips, dried drainage present. ALEX drain site w/ drsg, CDI.   Drains/Devices: NG to LIS w/ minimal green output. ALEX patent w/ minimal serous output.   Tests/Procedures for next shift: None  Anticipated DC date & active delays: TBD. Pending improvement.

## 2023-04-15 NOTE — PLAN OF CARE
Goal Outcome Evaluation:    POD2   1. Diagnostic laparoscopy  2. Laparoscopic repair of gastric ulcer    Pt A&Ox4, able to let needs be known. NG to LIS with 100ml bile output. Lap sites intact with steri-strips, dried drainage noted. ALEX drain in place- 25ml clear, yellow output, dressing C/D/I. Hypertensive: 160/105 and 159/101. Intermittent tachycardia noted (100's). Temps: 99.8 and 98.3. SOB with exertion, expiratory wheeze heard upon auscultation. Utilizing 3L of o2 via NC per pt request. IS re-explained and encouraged: fair use demonstrated. Strict NPO, oral swabs as needed. Faint bowel sounds, pt reports having diarrhea, passing flatus and denied nausea. Abdominal pain- PRN Dilaudid as needed. NS with 20 meq K+ infusing at 125ml. AM blood glucose resulted 92. Ambulating with 1 assist and a walker. Call light within reach.     Writer assumed care of pt from [id number].  Georgia Barth RN on 4/15/2023 at 6:38 AM

## 2023-04-16 LAB
GLUCOSE BLDC GLUCOMTR-MCNC: 116 MG/DL (ref 70–99)
GLUCOSE BLDC GLUCOMTR-MCNC: 68 MG/DL (ref 70–99)
GLUCOSE BLDC GLUCOMTR-MCNC: 75 MG/DL (ref 70–99)

## 2023-04-16 PROCEDURE — 99232 SBSQ HOSP IP/OBS MODERATE 35: CPT | Performed by: HOSPITALIST

## 2023-04-16 PROCEDURE — 120N000001 HC R&B MED SURG/OB

## 2023-04-16 PROCEDURE — 250N000011 HC RX IP 250 OP 636: Performed by: HOSPITALIST

## 2023-04-16 PROCEDURE — 250N000011 HC RX IP 250 OP 636: Performed by: PHYSICIAN ASSISTANT

## 2023-04-16 PROCEDURE — C9113 INJ PANTOPRAZOLE SODIUM, VIA: HCPCS | Performed by: PHYSICIAN ASSISTANT

## 2023-04-16 PROCEDURE — 258N000001 HC RX 258: Performed by: HOSPITALIST

## 2023-04-16 RX ORDER — DEXTROSE MONOHYDRATE 25 G/50ML
25-50 INJECTION, SOLUTION INTRAVENOUS
Status: DISCONTINUED | OUTPATIENT
Start: 2023-04-16 | End: 2023-04-19 | Stop reason: HOSPADM

## 2023-04-16 RX ORDER — DEXTROSE MONOHYDRATE, SODIUM CHLORIDE, SODIUM LACTATE, POTASSIUM CHLORIDE, CALCIUM CHLORIDE 5; 600; 310; 179; 20 G/100ML; MG/100ML; MG/100ML; MG/100ML; MG/100ML
INJECTION, SOLUTION INTRAVENOUS CONTINUOUS
Status: DISCONTINUED | OUTPATIENT
Start: 2023-04-16 | End: 2023-04-19

## 2023-04-16 RX ORDER — NICOTINE POLACRILEX 4 MG
15-30 LOZENGE BUCCAL
Status: DISCONTINUED | OUTPATIENT
Start: 2023-04-16 | End: 2023-04-19 | Stop reason: HOSPADM

## 2023-04-16 RX ADMIN — DEXTROSE MONOHYDRATE, SODIUM CHLORIDE, SODIUM LACTATE, POTASSIUM CHLORIDE, CALCIUM CHLORIDE: 5; 600; 310; 179; 20 INJECTION, SOLUTION INTRAVENOUS at 18:19

## 2023-04-16 RX ADMIN — ENOXAPARIN SODIUM 40 MG: 40 INJECTION SUBCUTANEOUS at 09:15

## 2023-04-16 RX ADMIN — POTASSIUM CHLORIDE AND SODIUM CHLORIDE: 900; 150 INJECTION, SOLUTION INTRAVENOUS at 00:10

## 2023-04-16 RX ADMIN — ENOXAPARIN SODIUM 40 MG: 40 INJECTION SUBCUTANEOUS at 20:15

## 2023-04-16 RX ADMIN — PIPERACILLIN AND TAZOBACTAM 3.38 G: 3; .375 INJECTION, POWDER, FOR SOLUTION INTRAVENOUS at 02:54

## 2023-04-16 RX ADMIN — HYDROMORPHONE HYDROCHLORIDE 0.2 MG: 0.2 INJECTION, SOLUTION INTRAMUSCULAR; INTRAVENOUS; SUBCUTANEOUS at 09:15

## 2023-04-16 RX ADMIN — PIPERACILLIN AND TAZOBACTAM 3.38 G: 3; .375 INJECTION, POWDER, FOR SOLUTION INTRAVENOUS at 14:40

## 2023-04-16 RX ADMIN — PANTOPRAZOLE SODIUM 40 MG: 40 INJECTION, POWDER, FOR SOLUTION INTRAVENOUS at 09:15

## 2023-04-16 RX ADMIN — FLUCONAZOLE 400 MG: 400 INJECTION, SOLUTION INTRAVENOUS at 14:40

## 2023-04-16 RX ADMIN — ONDANSETRON 4 MG: 2 INJECTION INTRAMUSCULAR; INTRAVENOUS at 02:55

## 2023-04-16 RX ADMIN — PANTOPRAZOLE SODIUM 40 MG: 40 INJECTION, POWDER, FOR SOLUTION INTRAVENOUS at 20:15

## 2023-04-16 RX ADMIN — DEXTROSE MONOHYDRATE 25 ML: 25 INJECTION, SOLUTION INTRAVENOUS at 14:40

## 2023-04-16 RX ADMIN — PIPERACILLIN AND TAZOBACTAM 3.38 G: 3; .375 INJECTION, POWDER, FOR SOLUTION INTRAVENOUS at 20:41

## 2023-04-16 RX ADMIN — PIPERACILLIN AND TAZOBACTAM 3.38 G: 3; .375 INJECTION, POWDER, FOR SOLUTION INTRAVENOUS at 09:19

## 2023-04-16 ASSESSMENT — ACTIVITIES OF DAILY LIVING (ADL)
ADLS_ACUITY_SCORE: 46
ADLS_ACUITY_SCORE: 46
ADLS_ACUITY_SCORE: 39
ADLS_ACUITY_SCORE: 46
ADLS_ACUITY_SCORE: 39
ADLS_ACUITY_SCORE: 39
ADLS_ACUITY_SCORE: 46
ADLS_ACUITY_SCORE: 39

## 2023-04-16 NOTE — PROGRESS NOTES
[facility]    Medicine Progress Note - Hospitalist Service    Date of Admission:  4/13/2023    Assessment & Plan   Silvia Yoon is a 57 year old female with below PMHx admitted on 4/13/2023. She presented with abdominal pain, nausea, and poor oral intake X1 week, found to have a perforated ulcer on CT, taken emergently to surgery by General Surgery. Hospitalist service was consulted for medical co-management.      S/P diagnostic laparoscopy with repair of perforated ulcer (4/13/23)  Hx of ulcer, H. Pylori  Procedure per Dr. Navarrete.   * WBC 11.5, CT A/P with free air in the upper abdomen anteriorly with stranding in the region of the stomach and duodenum. There is an air-fluid collection posterior to the stomach containing some high attenuation fluid posteriorly. Findings are concerning for perforated   ulcer. There is some free fluid adjacent to the spleen. Moderate free fluid in the pelvic cul-de-sac.   -- Defer routine post-operative cares, IVF, DVT prophylaxis and pain control to primary service   -- Defer drain and NG management to primary team   -- Pain management with IV dilaudid  -- DVT prophylaxis with lovenox 40 mg BID   -- Zosyn and fluconazole for infection prophylaxis x5 days  -- IVF with LR + D5+ KCl 20 mEq/L at 125 mL/hr   -- IV Protonix 40 mg BID   -- plan for Upper GI on Tuesday 4/18  -- Encourage IS and ambulation   -- Bowel regimen in place while on narcotics   -- Strict NPO   -- eventual endoscopy in 6-8weeks post op     Severe hypokalemia- resolved  Hypophosphatemia  K 2.7 on admission. Phos 2.1  - Electrolyte replacement protocol in place     Hypoglycemia  In setting of prolonged NPO. BG down to 68 on 4/16  - hypoglycemia protocol available  - BID glucose checks  - IVF now with d5 as of 4/16     VANDA- improving  Creat 1.24 on admission. Baseline 0.7-0.8.   - IVF as above   - trend BMP     Leukocytosis  11.5 on admission. Reactive in the setting of above.  "Afebrile.   - IV antibiotics as above   - WBC improving     Hypertension  Hold all PTA antihypertensives given NPO (amlodipine 5mg daily, hydrochlorothiazide 25mg daily, losartan 100mg daily)  - PRN hydralazine IV for SBP>160     Anxiety  Depression  Insomnia  PTA cymbalta 60mg daily, gabapentin 600mg BID, trazodone 75mg at bedtime     Hx of melanoma  Noted on back years ago s/p resection and no further treatment. Gets annual surveillance.      MATI  CPAP with home settings ordered    Severe Obesity  Estimated body mass index is 60.08 kg/m  as calculated from the following:    Height as of this encounter: 1.626 m (5' 4\").    Weight as of this encounter: 158.8 kg (350 lb).         Diet: NPO for Medical/Clinical Reasons Except for: No Exceptions, Other; Specify: no oral meds    DVT Prophylaxis: Enoxaparin (Lovenox) SQ  Bates Catheter: Not present  Lines: None     Cardiac Monitoring: None  Code Status: Full Code      Clinically Significant Risk Factors                         # Severe Obesity: Estimated body mass index is 60.08 kg/m  as calculated from the following:    Height as of this encounter: 1.626 m (5' 4\").    Weight as of this encounter: 158.8 kg (350 lb)., PRESENT ON ADMISSION         Disposition Plan      Expected Discharge Date: 04/19/2023,  3:00 PM    Destination: home            Haley Collins DO  Hospitalist Service  [facility]  Securely message with [employer] (more info)  Text page via [employer] Paging/Directory   ______________________________________________________________________    Interval History   Patient seen and examined. No family at bedside this morning. Doing well. Went for a walk. Doing better with her IS--up to 1500! Using less IV dilaudid overall. Felt nauseous after valium--so holding off on that for today. Lower BG noted on spot check- remains NPO so will adjust IVF. Discussed with RN    Physical Exam   Vital Signs: Temp: 98.4  F (36.9  C) Temp src: Oral BP: (!) " 160/95 Pulse: 98   Resp: 18 SpO2: 98 % O2 Device: Nasal cannula Oxygen Delivery: 2 LPM  Weight: 350 lbs 0 oz     Constitutional: Awake, alert, cooperative, no apparent distress  Respiratory: Clear to auscultation bilaterally, no crackles or wheezing  Cardiovascular: Regular rate and rhythm, normal S1 and S2, and no murmur noted  GI: Normal bowel sounds, soft, non-distended, non-tender. ALEX drain intact with serous drainage. Incisions noted with steri strips.  Skin/Integumen: No rashes, no cyanosis, trace to +1 edema  Other:     Medical Decision Making       35 MINUTES SPENT BY ME on the date of service doing chart review, history, exam, documentation & further activities per the note.          Data         Imaging results reviewed over the past 24 hrs:   No results found for this or any previous visit (from the past 24 hour(s)).

## 2023-04-16 NOTE — PLAN OF CARE
Goal Outcome Evaluation:    POD3  1. Diagnostic laparoscopy  2. Laparoscopic repair of gastric ulcer     Pt A&Ox4, able to let needs be known. NG to LIS with bile output. Lap sites intact with steri-strips, dried drainage noted. ALEX drain in place- 20ml clear, yellow output, dressing C/D/I. Intermittent tachycardia noted (100's). Temp 99.4. SOB with exertion. Utilizing 3L of o2 via NC: saturations high 90's- weaned pt to 1L via NC and saturations >92%. IS re-explained and encouraged: fair use demonstrated. Strict NPO, oral swabs as needed. Faint bowel sounds, pt reports having diarrhea and passing flatus. Mild nausea noted- PRN Zofran administered with relief. Abdominal pain- PRN Dilaudid available, pt declined overnight. NS with 20 meq K+ infusing at 125ml. Ankle edema- elevated. Ambulating with 1 assist and a walker. Call light within reach.     Writer assumed care of pt from [id number].  Georgia Barth RN on 4/16/2023 at 6:25 AM

## 2023-04-16 NOTE — PROGRESS NOTES
Patient now postop day #3 following laparoscopic repair of perforated ulcer, reports overall she is feeling quite well.  Feels daily improvement.  Denies nausea.  Reports pain well controlled.  Had a bowel movement yesterday    Afebrile, mildly hypertensive, nontachycardic  Abdomen soft, nondistended and minimal tenderness, ALEX drain serous  White count continues to slowly improve but remains slightly elevated    Assessment/plan: Postrepair of perforated ulcer  Continue NG tube and n.p.o. status  Continue IV antibiotics  Anticipate upper GI study on Tuesday  Continue ambulation

## 2023-04-17 ENCOUNTER — APPOINTMENT (OUTPATIENT)
Dept: GENERAL RADIOLOGY | Facility: CLINIC | Age: 58
DRG: 327 | End: 2023-04-17
Attending: SURGERY
Payer: COMMERCIAL

## 2023-04-17 LAB
ANION GAP SERPL CALCULATED.3IONS-SCNC: 9 MMOL/L (ref 7–15)
BUN SERPL-MCNC: 9.1 MG/DL (ref 6–20)
CALCIUM SERPL-MCNC: 8.6 MG/DL (ref 8.6–10)
CHLORIDE SERPL-SCNC: 112 MMOL/L (ref 98–107)
CREAT SERPL-MCNC: 1.01 MG/DL (ref 0.51–0.95)
DEPRECATED HCO3 PLAS-SCNC: 24 MMOL/L (ref 22–29)
ERYTHROCYTE [DISTWIDTH] IN BLOOD BY AUTOMATED COUNT: 15.3 % (ref 10–15)
GFR SERPL CREATININE-BSD FRML MDRD: 65 ML/MIN/1.73M2
GLUCOSE BLDC GLUCOMTR-MCNC: 100 MG/DL (ref 70–99)
GLUCOSE BLDC GLUCOMTR-MCNC: 109 MG/DL (ref 70–99)
GLUCOSE BLDC GLUCOMTR-MCNC: 97 MG/DL (ref 70–99)
GLUCOSE BLDC GLUCOMTR-MCNC: 98 MG/DL (ref 70–99)
GLUCOSE SERPL-MCNC: 128 MG/DL (ref 70–99)
HCT VFR BLD AUTO: 30.8 % (ref 35–47)
HGB BLD-MCNC: 10.1 G/DL (ref 11.7–15.7)
MAGNESIUM SERPL-MCNC: 1.8 MG/DL (ref 1.7–2.3)
MAGNESIUM SERPL-MCNC: 1.8 MG/DL (ref 1.7–2.3)
MCH RBC QN AUTO: 31.8 PG (ref 26.5–33)
MCHC RBC AUTO-ENTMCNC: 32.8 G/DL (ref 31.5–36.5)
MCV RBC AUTO: 97 FL (ref 78–100)
PHOSPHATE SERPL-MCNC: 2 MG/DL (ref 2.5–4.5)
PLATELET # BLD AUTO: 300 10E3/UL (ref 150–450)
PLATELET # BLD AUTO: 372 10E3/UL (ref 150–450)
POTASSIUM SERPL-SCNC: 3.7 MMOL/L (ref 3.4–5.3)
RBC # BLD AUTO: 3.18 10E6/UL (ref 3.8–5.2)
SODIUM SERPL-SCNC: 145 MMOL/L (ref 136–145)
WBC # BLD AUTO: 7.1 10E3/UL (ref 4–11)

## 2023-04-17 PROCEDURE — 250N000013 HC RX MED GY IP 250 OP 250 PS 637: Performed by: SURGERY

## 2023-04-17 PROCEDURE — 250N000011 HC RX IP 250 OP 636: Performed by: HOSPITALIST

## 2023-04-17 PROCEDURE — C9113 INJ PANTOPRAZOLE SODIUM, VIA: HCPCS | Performed by: PHYSICIAN ASSISTANT

## 2023-04-17 PROCEDURE — 83735 ASSAY OF MAGNESIUM: CPT | Performed by: PHYSICIAN ASSISTANT

## 2023-04-17 PROCEDURE — 250N000013 HC RX MED GY IP 250 OP 250 PS 637: Performed by: HOSPITALIST

## 2023-04-17 PROCEDURE — 85049 AUTOMATED PLATELET COUNT: CPT | Performed by: HOSPITALIST

## 2023-04-17 PROCEDURE — 84100 ASSAY OF PHOSPHORUS: CPT | Performed by: HOSPITALIST

## 2023-04-17 PROCEDURE — 999N000063 XR UPPER GI WATER SOLUBLE

## 2023-04-17 PROCEDURE — 36415 COLL VENOUS BLD VENIPUNCTURE: CPT | Performed by: PHYSICIAN ASSISTANT

## 2023-04-17 PROCEDURE — 250N000009 HC RX 250: Performed by: HOSPITALIST

## 2023-04-17 PROCEDURE — 80048 BASIC METABOLIC PNL TOTAL CA: CPT | Performed by: HOSPITALIST

## 2023-04-17 PROCEDURE — 250N000011 HC RX IP 250 OP 636: Performed by: PHYSICIAN ASSISTANT

## 2023-04-17 PROCEDURE — 99232 SBSQ HOSP IP/OBS MODERATE 35: CPT | Performed by: HOSPITALIST

## 2023-04-17 PROCEDURE — 258N000001 HC RX 258: Performed by: HOSPITALIST

## 2023-04-17 PROCEDURE — 258N000003 HC RX IP 258 OP 636: Performed by: HOSPITALIST

## 2023-04-17 PROCEDURE — 120N000001 HC R&B MED SURG/OB

## 2023-04-17 PROCEDURE — 85027 COMPLETE CBC AUTOMATED: CPT | Performed by: PHYSICIAN ASSISTANT

## 2023-04-17 PROCEDURE — 36415 COLL VENOUS BLD VENIPUNCTURE: CPT | Performed by: HOSPITALIST

## 2023-04-17 RX ORDER — SUCRALFATE ORAL 1 G/10ML
1 SUSPENSION ORAL
Status: DISCONTINUED | OUTPATIENT
Start: 2023-04-17 | End: 2023-04-19 | Stop reason: HOSPADM

## 2023-04-17 RX ORDER — GABAPENTIN 300 MG/1
600 CAPSULE ORAL 2 TIMES DAILY
Status: DISCONTINUED | OUTPATIENT
Start: 2023-04-17 | End: 2023-04-19 | Stop reason: HOSPADM

## 2023-04-17 RX ORDER — HYDROXYZINE HYDROCHLORIDE 25 MG/1
50 TABLET, FILM COATED ORAL EVERY 6 HOURS PRN
Status: DISCONTINUED | OUTPATIENT
Start: 2023-04-17 | End: 2023-04-19 | Stop reason: HOSPADM

## 2023-04-17 RX ORDER — AMLODIPINE BESYLATE 5 MG/1
5 TABLET ORAL DAILY
Status: DISCONTINUED | OUTPATIENT
Start: 2023-04-18 | End: 2023-04-19 | Stop reason: HOSPADM

## 2023-04-17 RX ORDER — ACETAMINOPHEN 325 MG/1
650 TABLET ORAL EVERY 4 HOURS PRN
Status: DISCONTINUED | OUTPATIENT
Start: 2023-04-17 | End: 2023-04-19 | Stop reason: HOSPADM

## 2023-04-17 RX ORDER — ACETAMINOPHEN 650 MG/1
650 SUPPOSITORY RECTAL EVERY 4 HOURS PRN
Status: DISCONTINUED | OUTPATIENT
Start: 2023-04-17 | End: 2023-04-19 | Stop reason: HOSPADM

## 2023-04-17 RX ORDER — DULOXETIN HYDROCHLORIDE 60 MG/1
60 CAPSULE, DELAYED RELEASE ORAL DAILY
Status: DISCONTINUED | OUTPATIENT
Start: 2023-04-18 | End: 2023-04-19 | Stop reason: HOSPADM

## 2023-04-17 RX ORDER — OXYCODONE HYDROCHLORIDE 5 MG/1
5 TABLET ORAL EVERY 4 HOURS PRN
Status: DISCONTINUED | OUTPATIENT
Start: 2023-04-17 | End: 2023-04-19 | Stop reason: HOSPADM

## 2023-04-17 RX ORDER — LABETALOL HYDROCHLORIDE 5 MG/ML
10 INJECTION, SOLUTION INTRAVENOUS
Status: DISCONTINUED | OUTPATIENT
Start: 2023-04-17 | End: 2023-04-19 | Stop reason: HOSPADM

## 2023-04-17 RX ORDER — HYDROXYZINE HYDROCHLORIDE 25 MG/1
25 TABLET, FILM COATED ORAL EVERY 6 HOURS PRN
Status: DISCONTINUED | OUTPATIENT
Start: 2023-04-17 | End: 2023-04-19 | Stop reason: HOSPADM

## 2023-04-17 RX ADMIN — ENOXAPARIN SODIUM 40 MG: 40 INJECTION SUBCUTANEOUS at 21:18

## 2023-04-17 RX ADMIN — HYDROXYZINE HYDROCHLORIDE 25 MG: 25 TABLET ORAL at 17:03

## 2023-04-17 RX ADMIN — PIPERACILLIN AND TAZOBACTAM 3.38 G: 3; .375 INJECTION, POWDER, FOR SOLUTION INTRAVENOUS at 02:28

## 2023-04-17 RX ADMIN — SUCRALFATE ORAL 1 G: 1 SUSPENSION ORAL at 21:14

## 2023-04-17 RX ADMIN — PIPERACILLIN AND TAZOBACTAM 3.38 G: 3; .375 INJECTION, POWDER, FOR SOLUTION INTRAVENOUS at 21:18

## 2023-04-17 RX ADMIN — LABETALOL HYDROCHLORIDE 10 MG: 5 INJECTION, SOLUTION INTRAVENOUS at 10:47

## 2023-04-17 RX ADMIN — SUCRALFATE ORAL 1 G: 1 SUSPENSION ORAL at 18:01

## 2023-04-17 RX ADMIN — ENOXAPARIN SODIUM 40 MG: 40 INJECTION SUBCUTANEOUS at 08:56

## 2023-04-17 RX ADMIN — GABAPENTIN 600 MG: 300 CAPSULE ORAL at 21:14

## 2023-04-17 RX ADMIN — DEXTROSE MONOHYDRATE, SODIUM CHLORIDE, SODIUM LACTATE, POTASSIUM CHLORIDE, CALCIUM CHLORIDE: 5; 600; 310; 179; 20 INJECTION, SOLUTION INTRAVENOUS at 12:52

## 2023-04-17 RX ADMIN — HYDROXYZINE HYDROCHLORIDE 50 MG: 25 TABLET, FILM COATED ORAL at 23:08

## 2023-04-17 RX ADMIN — SODIUM PHOSPHATE, MONOBASIC, MONOHYDRATE AND SODIUM PHOSPHATE, DIBASIC, ANHYDROUS 15 MMOL: 142; 276 INJECTION, SOLUTION INTRAVENOUS at 03:33

## 2023-04-17 RX ADMIN — PIPERACILLIN AND TAZOBACTAM 3.38 G: 3; .375 INJECTION, POWDER, FOR SOLUTION INTRAVENOUS at 08:56

## 2023-04-17 RX ADMIN — PANTOPRAZOLE SODIUM 40 MG: 40 INJECTION, POWDER, FOR SOLUTION INTRAVENOUS at 21:13

## 2023-04-17 RX ADMIN — FLUCONAZOLE 400 MG: 400 INJECTION, SOLUTION INTRAVENOUS at 10:13

## 2023-04-17 RX ADMIN — ONDANSETRON 4 MG: 2 INJECTION INTRAMUSCULAR; INTRAVENOUS at 01:55

## 2023-04-17 RX ADMIN — ONDANSETRON 4 MG: 2 INJECTION INTRAMUSCULAR; INTRAVENOUS at 09:03

## 2023-04-17 RX ADMIN — PIPERACILLIN AND TAZOBACTAM 3.38 G: 3; .375 INJECTION, POWDER, FOR SOLUTION INTRAVENOUS at 14:32

## 2023-04-17 RX ADMIN — HYDRALAZINE HYDROCHLORIDE 10 MG: 20 INJECTION INTRAMUSCULAR; INTRAVENOUS at 09:25

## 2023-04-17 RX ADMIN — PANTOPRAZOLE SODIUM 40 MG: 40 INJECTION, POWDER, FOR SOLUTION INTRAVENOUS at 08:56

## 2023-04-17 RX ADMIN — TRAZODONE HYDROCHLORIDE 75 MG: 50 TABLET ORAL at 21:14

## 2023-04-17 RX ADMIN — DEXTROSE MONOHYDRATE, SODIUM CHLORIDE, SODIUM LACTATE, POTASSIUM CHLORIDE, CALCIUM CHLORIDE: 5; 600; 310; 179; 20 INJECTION, SOLUTION INTRAVENOUS at 04:02

## 2023-04-17 ASSESSMENT — ACTIVITIES OF DAILY LIVING (ADL)
ADLS_ACUITY_SCORE: 29
ADLS_ACUITY_SCORE: 28
ADLS_ACUITY_SCORE: 46
ADLS_ACUITY_SCORE: 29
ADLS_ACUITY_SCORE: 44
ADLS_ACUITY_SCORE: 28
ADLS_ACUITY_SCORE: 29
EQUIPMENT_CURRENTLY_USED_AT_HOME: CANE, STRAIGHT

## 2023-04-17 NOTE — PROGRESS NOTES
Surgery Update    UGI study looks ok. No leak. Remove NGT and start clear liquids. Started carafate as well. I will ask [facility] to see pt tomorrow to weigh in on h/o peptic ulcer disease, prior h. Pylori and any additional recommendations for current treatment as well as to establish care for future upper endoscopy in 6-8 weeks.     Edilma Navarrete MD  [facility]  [phone]

## 2023-04-17 NOTE — PROGRESS NOTES
[facility]    Medicine Progress Note - Hospitalist Service    Date of Admission:  4/13/2023    Assessment & Plan   Silvia Yoon is a 57 year old female with below PMHx admitted on 4/13/2023. She presented with abdominal pain, nausea, and poor oral intake X1 week, found to have a perforated ulcer on CT, taken emergently to surgery by General Surgery. Hospitalist service was consulted for medical co-management.      S/P diagnostic laparoscopy with repair of perforated ulcer (4/13/23)  Hx of ulcer, H. Pylori  Procedure per Dr. Navarrete.   * WBC 11.5, CT A/P with free air in the upper abdomen anteriorly with stranding in the region of the stomach and duodenum. There is an air-fluid collection posterior to the stomach containing some high attenuation fluid posteriorly. Findings are concerning for perforated   ulcer. There is some free fluid adjacent to the spleen. Moderate free fluid in the pelvic cul-de-sac.   *NGT removed 4/17 after no leak demonstrated on upper GI  -- Defer routine post-operative cares, drain to primary service   -- Pain management with PRN acetaminophen, oxycodone, IV dilaudid  -- DVT prophylaxis with lovenox 40 mg BID   -- Zosyn and fluconazole for infection prophylaxis x7 days  -- IVF with LR + D5+ KCl 20 mEq/L at 75 mL/hr   -- IV Protonix 40 mg BID   -- carafate QID  -- Encourage IS and ambulation   -- clear liquids  -- eventual endoscopy in 6-8 weeks post op, GI consulted     Severe hypokalemia- resolved  Hypophosphatemia  K 2.7 on admission. Phos 2.1  - Electrolyte replacement protocol in place     Hypoglycemia  In setting of prolonged NPO. BG down to 68 on 4/16  - hypoglycemia protocol available  - BID glucose checks  - IVF now with d5 as of 4/16     VANDA- improving  Creat 1.24 on admission. Baseline 0.7-0.8.   - IVF as above   - trend BMP     Leukocytosis- resolved  11.5 on admission. Reactive in the setting of above. Afebrile.   - IV antibiotics as above   - WBC  "normalized on 4/17     Hypertension  Holding hydrochlorothiazide 25mg daily, losartan 100mg daily  - PRN hydralazine and labetalol IV for SBP>160  - resume PTA amlodipine on 4/18     Anxiety  Depression  Insomnia  PTA cymbalta 60mg daily, gabapentin 600mg BID, trazodone 75mg at bedtime, resumed 4/17  - PRN atarax for anxiety available     Hx of melanoma  Noted on back years ago s/p resection and no further treatment. Gets annual surveillance.      MATI  CPAP with home settings ordered    Severe Obesity  Estimated body mass index is 60.08 kg/m  as calculated from the following:    Height as of this encounter: 1.626 m (5' 4\").    Weight as of this encounter: 158.8 kg (350 lb).         Diet: NPO for Medical/Clinical Reasons Except for: No Exceptions, Other; Specify: no oral meds    DVT Prophylaxis: Enoxaparin (Lovenox) SQ  Bates Catheter: Not present  Lines: None     Cardiac Monitoring: None  Code Status: Full Code      Clinically Significant Risk Factors                         # Severe Obesity: Estimated body mass index is 60.08 kg/m  as calculated from the following:    Height as of this encounter: 1.626 m (5' 4\").    Weight as of this encounter: 158.8 kg (350 lb).          Disposition Plan      Expected Discharge Date: 04/19/2023,  3:00 PM    Destination: home            Haley Collins DO  Hospitalist Service  [facility]  Securely message with [employer] (more info)  Text page via [employer] Paging/Directory   ______________________________________________________________________    Interval History   Patient seen and examined. NGT now with some pink/blood tinged and with increased volume. Not required IV dilaudid since 4/16. Blood pressures higher today, stressed about test later tonight and change in NGT output. Added labetalol. IS effort looks good. Family at bedside. Discussed care plan with RN.    Physical Exam   Vital Signs: Temp: (P) 98.8  F (37.1  C) Temp src: (P) Oral BP: (!) (P) 158/92 " Pulse: (P) 85   Resp: (P) 18 SpO2: (P) 95 % O2 Device: (P) None (Room air) Oxygen Delivery: 1 LPM  Weight: 350 lbs 0 oz     Constitutional: Awake, alert, cooperative, no apparent distress  Respiratory: Clear to auscultation bilaterally, no crackles or wheezing  Cardiovascular: Regular rate and rhythm, normal S1 and S2, and no murmur noted  GI: soft, non-distended, non-tender. ALEX drain intact with serous drainage. Incisions with steri strips. NGT with pink drainage  Skin/Integumen: No rashes, no cyanosis, trace to +1 edema  Other:     Medical Decision Making       35 MINUTES SPENT BY ME on the date of service doing chart review, history, exam, documentation & further activities per the note.          Data     I have personally reviewed the following data over the past 24 hrs:    7.1  \   10.1 (L)   / 300     145 112 (H) 9.1 /  128 (H)   3.7 24 1.01 (H) \       Imaging results reviewed over the past 24 hrs:   No results found for this or any previous visit (from the past 24 hour(s)).

## 2023-04-17 NOTE — PLAN OF CARE
Goal Outcome Evaluation: Pt A/O x4, VSS. Weaned to RA while awake. Performs I.S. frequently reaching just over 1000 today. Pt pain very well managed with need for IV dilaudid x1. IV fluids changed d/t low BG+ 68. 25ml Dextrose administered to bring BG to 116. ALEX drain to RUQ with small serous output. NG to LIS with 150 out this shift. NPO except swabs. Lap sites intact w/ steri-strips. No nausea this shift. Up SBA + walker to BR with adequate UOP and small amount diarrhea.       Plan of Care Reviewed With: patient, friend

## 2023-04-17 NOTE — PROGRESS NOTES
Consult received for pt who presented with perforation gastric ulcer, s/p lap repair 4/13.  NGT removed today, Dr. Navarrete asking our service to see the patient tomorrow, help coordinate further eval of peptic ulcer disease.   Full consult will follow tomorrow.      Yefri Quinn DO   [facility]  Cell [phone]

## 2023-04-17 NOTE — PLAN OF CARE
Goal Outcome Evaluation:    POD#4  1. Diagnostic laparoscopy  2. Laparoscopic repair of gastric ulcer     Pt A&Ox4, able to let needs be known. NG to LIS with 400ml pink tinged/watermelon color output- MD paged (see below). Lap sites intact with steri-strips, dried drainage noted. ALEX drain in place- 10ml clear, yellow output, dressing intact. SOB with exertion. Utilizing 1L of o2 via NC- saturations high 90's- weaned pt to RA, tolerating and saturations >92%. IS encouraged. Strict NPO, oral swabs as needed. Faint bowel sounds, pt having diarrhea and passing flatus. Mild nausea noted- PRN Zofran administered with relief. Abdominal pain- PRN Dilaudid available, pt declined overnight. D5 in LR with 20 meq K+ infusing at 125ml. Phosphorus resulted 2.0- replaced, protocol followed. Ankle/hand edema- elevated. Ambulating with 1 assist and a walker. Call light within reach.       Provider notification:  Time : RM 2224. Cornelius. : [birth year]. NG no longer bile/green output, now with 200ml clear, pink tinged output. Measuring at 56.5cm, previously 59cm. Please advise, thanks! FLORESITA Ho #[id number]     Outcome: Call back received at  from DUSTIN Hart MD. MD verbalized findings are not concerning, and no need to manipulate NG placement or verify placement.     Writer assumed care of pt from [id number].  Georgia Barth RN on 2023 at 6:41 AM

## 2023-04-17 NOTE — PLAN OF CARE
POD 4 from a gastric ulcer repair. A&O, anxious at times. CMS - 1+ generalized edema, generally weak. Bowel sounds hypoactive/+x4 and faint, + flatus, loose BM x2, tolerating clear liquid diet. NG removed per general surgery. VSS ex HTN - hydralazine, labetalol & atarax given. Dressing with dried drainage. ALEX drain patent with serous output. Up with  A1 to BR - refused to ambulate in halls. Denies pain. Continue to monitor.

## 2023-04-17 NOTE — PROGRESS NOTES
"General Surgery Progress Note    Subjective: Pt reports she is doing ok. Better each day since surgery. Minimal abdominal pain. NGT output more pinkish and increased volume overnight.     Objective: BP (!) 144/95 (BP Location: Right arm, Patient Position: Semi-Ball's, Cuff Size: Adult Large)   Pulse 91   Temp 98.2  F (36.8  C) (Oral)   Resp 18   Ht 1.626 m (5' 4\")   Wt (!) 158.8 kg (350 lb)   SpO2 97%   BMI 60.08 kg/m    Gen: lying comfortably in bed  CV: RRR  Pulm: nonlabored breathing  Abd: soft, incisions look fine. ALEX is serous.   Ext: WWP    Assessment/Plan:   Silvia Yoon  is a 57 year old female s/p laparoscopic repair of perforated gastric ulcer. Improving. Discussed we could get gastrograffin upper GI today to assess given change in character of NGT output, would like to get tube out if able to decrease irritation of the gastric mucosa.   - gastrograffin upper GI today, if no evidence of leak, remove NGT and start clear liquids. If leak - keep NGT to LIS and start TPN with plan for re-study in 5 days.   - continue abx and antifungals for 7 days total    Edilma Navarrete MD  [facility]  [phone]              "

## 2023-04-18 ENCOUNTER — APPOINTMENT (OUTPATIENT)
Dept: PHYSICAL THERAPY | Facility: CLINIC | Age: 58
DRG: 327 | End: 2023-04-18
Attending: HOSPITALIST
Payer: COMMERCIAL

## 2023-04-18 ENCOUNTER — HOSPITAL ENCOUNTER (OUTPATIENT)
Facility: CLINIC | Age: 58
End: 2023-04-18
Attending: INTERNAL MEDICINE | Admitting: INTERNAL MEDICINE
Payer: COMMERCIAL

## 2023-04-18 LAB
ANION GAP SERPL CALCULATED.3IONS-SCNC: 9 MMOL/L (ref 7–15)
BUN SERPL-MCNC: 6.2 MG/DL (ref 6–20)
CALCIUM SERPL-MCNC: 8.6 MG/DL (ref 8.6–10)
CHLORIDE SERPL-SCNC: 111 MMOL/L (ref 98–107)
CREAT SERPL-MCNC: 1.03 MG/DL (ref 0.51–0.95)
DEPRECATED HCO3 PLAS-SCNC: 23 MMOL/L (ref 22–29)
ERYTHROCYTE [DISTWIDTH] IN BLOOD BY AUTOMATED COUNT: 15.6 % (ref 10–15)
GFR SERPL CREATININE-BSD FRML MDRD: 63 ML/MIN/1.73M2
GLUCOSE BLDC GLUCOMTR-MCNC: 84 MG/DL (ref 70–99)
GLUCOSE SERPL-MCNC: 93 MG/DL (ref 70–99)
HCT VFR BLD AUTO: 32.1 % (ref 35–47)
HGB BLD-MCNC: 10.3 G/DL (ref 11.7–15.7)
MAGNESIUM SERPL-MCNC: 1.7 MG/DL (ref 1.7–2.3)
MCH RBC QN AUTO: 31.2 PG (ref 26.5–33)
MCHC RBC AUTO-ENTMCNC: 32.1 G/DL (ref 31.5–36.5)
MCV RBC AUTO: 97 FL (ref 78–100)
PHOSPHATE SERPL-MCNC: 2.8 MG/DL (ref 2.5–4.5)
PLATELET # BLD AUTO: 322 10E3/UL (ref 150–450)
POTASSIUM SERPL-SCNC: 3.6 MMOL/L (ref 3.4–5.3)
RBC # BLD AUTO: 3.3 10E6/UL (ref 3.8–5.2)
SODIUM SERPL-SCNC: 143 MMOL/L (ref 136–145)
WBC # BLD AUTO: 8.5 10E3/UL (ref 4–11)

## 2023-04-18 PROCEDURE — 97161 PT EVAL LOW COMPLEX 20 MIN: CPT | Mod: GP | Performed by: PHYSICAL THERAPIST

## 2023-04-18 PROCEDURE — 83735 ASSAY OF MAGNESIUM: CPT | Performed by: HOSPITALIST

## 2023-04-18 PROCEDURE — 120N000001 HC R&B MED SURG/OB

## 2023-04-18 PROCEDURE — 258N000001 HC RX 258: Performed by: HOSPITALIST

## 2023-04-18 PROCEDURE — 250N000011 HC RX IP 250 OP 636: Performed by: HOSPITALIST

## 2023-04-18 PROCEDURE — 250N000013 HC RX MED GY IP 250 OP 250 PS 637: Performed by: INTERNAL MEDICINE

## 2023-04-18 PROCEDURE — 84100 ASSAY OF PHOSPHORUS: CPT | Performed by: HOSPITALIST

## 2023-04-18 PROCEDURE — C9113 INJ PANTOPRAZOLE SODIUM, VIA: HCPCS | Performed by: PHYSICIAN ASSISTANT

## 2023-04-18 PROCEDURE — 82310 ASSAY OF CALCIUM: CPT | Performed by: HOSPITALIST

## 2023-04-18 PROCEDURE — 85027 COMPLETE CBC AUTOMATED: CPT | Performed by: HOSPITALIST

## 2023-04-18 PROCEDURE — 97530 THERAPEUTIC ACTIVITIES: CPT | Mod: GP | Performed by: PHYSICAL THERAPIST

## 2023-04-18 PROCEDURE — 97116 GAIT TRAINING THERAPY: CPT | Mod: GP | Performed by: PHYSICAL THERAPIST

## 2023-04-18 PROCEDURE — 99232 SBSQ HOSP IP/OBS MODERATE 35: CPT | Performed by: HOSPITALIST

## 2023-04-18 PROCEDURE — 36415 COLL VENOUS BLD VENIPUNCTURE: CPT | Performed by: HOSPITALIST

## 2023-04-18 PROCEDURE — 250N000013 HC RX MED GY IP 250 OP 250 PS 637: Performed by: HOSPITALIST

## 2023-04-18 PROCEDURE — 250N000011 HC RX IP 250 OP 636: Performed by: PHYSICIAN ASSISTANT

## 2023-04-18 PROCEDURE — 250N000013 HC RX MED GY IP 250 OP 250 PS 637: Performed by: SURGERY

## 2023-04-18 RX ORDER — PANTOPRAZOLE SODIUM 40 MG/1
40 TABLET, DELAYED RELEASE ORAL
Status: DISCONTINUED | OUTPATIENT
Start: 2023-04-18 | End: 2023-04-19 | Stop reason: HOSPADM

## 2023-04-18 RX ORDER — LOSARTAN POTASSIUM 50 MG/1
50 TABLET ORAL DAILY
Status: DISCONTINUED | OUTPATIENT
Start: 2023-04-19 | End: 2023-04-19 | Stop reason: HOSPADM

## 2023-04-18 RX ORDER — HYDROXYZINE HYDROCHLORIDE 25 MG/1
25 TABLET, FILM COATED ORAL EVERY 6 HOURS PRN
Qty: 30 TABLET | Refills: 0 | Status: SHIPPED | OUTPATIENT
Start: 2023-04-18

## 2023-04-18 RX ORDER — PANTOPRAZOLE SODIUM 40 MG/1
40 TABLET, DELAYED RELEASE ORAL
Qty: 60 TABLET | Refills: 1 | Status: SHIPPED | OUTPATIENT
Start: 2023-04-18

## 2023-04-18 RX ORDER — LOSARTAN POTASSIUM 50 MG/1
50 TABLET ORAL ONCE
Status: COMPLETED | OUTPATIENT
Start: 2023-04-18 | End: 2023-04-18

## 2023-04-18 RX ADMIN — DEXTROSE MONOHYDRATE, SODIUM CHLORIDE, SODIUM LACTATE, POTASSIUM CHLORIDE, CALCIUM CHLORIDE: 5; 600; 310; 179; 20 INJECTION, SOLUTION INTRAVENOUS at 02:03

## 2023-04-18 RX ADMIN — GABAPENTIN 600 MG: 300 CAPSULE ORAL at 09:08

## 2023-04-18 RX ADMIN — FLUCONAZOLE 400 MG: 400 INJECTION, SOLUTION INTRAVENOUS at 10:30

## 2023-04-18 RX ADMIN — GABAPENTIN 600 MG: 300 CAPSULE ORAL at 17:44

## 2023-04-18 RX ADMIN — AMLODIPINE BESYLATE 5 MG: 5 TABLET ORAL at 09:08

## 2023-04-18 RX ADMIN — SUCRALFATE ORAL 1 G: 1 SUSPENSION ORAL at 09:07

## 2023-04-18 RX ADMIN — HYDRALAZINE HYDROCHLORIDE 10 MG: 20 INJECTION INTRAMUSCULAR; INTRAVENOUS at 16:17

## 2023-04-18 RX ADMIN — SUCRALFATE ORAL 1 G: 1 SUSPENSION ORAL at 17:45

## 2023-04-18 RX ADMIN — SUCRALFATE ORAL 1 G: 1 SUSPENSION ORAL at 21:46

## 2023-04-18 RX ADMIN — TRAZODONE HYDROCHLORIDE 75 MG: 50 TABLET ORAL at 21:46

## 2023-04-18 RX ADMIN — LOSARTAN POTASSIUM 50 MG: 50 TABLET, FILM COATED ORAL at 10:48

## 2023-04-18 RX ADMIN — PANTOPRAZOLE SODIUM 40 MG: 40 TABLET, DELAYED RELEASE ORAL at 17:45

## 2023-04-18 RX ADMIN — DULOXETINE HYDROCHLORIDE 60 MG: 60 CAPSULE, DELAYED RELEASE ORAL at 09:08

## 2023-04-18 RX ADMIN — PANTOPRAZOLE SODIUM 40 MG: 40 INJECTION, POWDER, FOR SOLUTION INTRAVENOUS at 09:08

## 2023-04-18 RX ADMIN — PIPERACILLIN AND TAZOBACTAM 3.38 G: 3; .375 INJECTION, POWDER, FOR SOLUTION INTRAVENOUS at 13:21

## 2023-04-18 RX ADMIN — PIPERACILLIN AND TAZOBACTAM 3.38 G: 3; .375 INJECTION, POWDER, FOR SOLUTION INTRAVENOUS at 20:10

## 2023-04-18 RX ADMIN — ENOXAPARIN SODIUM 40 MG: 40 INJECTION SUBCUTANEOUS at 09:08

## 2023-04-18 RX ADMIN — PIPERACILLIN AND TAZOBACTAM 3.38 G: 3; .375 INJECTION, POWDER, FOR SOLUTION INTRAVENOUS at 09:25

## 2023-04-18 RX ADMIN — ENOXAPARIN SODIUM 40 MG: 40 INJECTION SUBCUTANEOUS at 21:46

## 2023-04-18 RX ADMIN — ACETAMINOPHEN 650 MG: 325 TABLET ORAL at 09:07

## 2023-04-18 RX ADMIN — PIPERACILLIN AND TAZOBACTAM 3.38 G: 3; .375 INJECTION, POWDER, FOR SOLUTION INTRAVENOUS at 02:03

## 2023-04-18 RX ADMIN — HYDROXYZINE HYDROCHLORIDE 25 MG: 25 TABLET ORAL at 09:32

## 2023-04-18 RX ADMIN — SUCRALFATE ORAL 1 G: 1 SUSPENSION ORAL at 12:22

## 2023-04-18 ASSESSMENT — ACTIVITIES OF DAILY LIVING (ADL)
ADLS_ACUITY_SCORE: 28

## 2023-04-18 NOTE — PROGRESS NOTES
"General Surgery Progress Note    Subjective: Peg reports she is feeling better overall. Some low grade temps, 99.6. tolerating clear liquids without nausea. Feels more firm on left upper abdomen subjectively. Having many liquid stools.     Objective: BP (!) 160/95 (BP Location: Left arm)   Pulse 86   Temp 99.6  F (37.6  C) (Oral)   Resp 18   Ht 1.626 m (5' 4\")   Wt (!) 158.8 kg (350 lb)   SpO2 95%   BMI 60.08 kg/m    Gen: alert, no distress  CV: RRR  Pulm: nonlabored breathing  Abd: soft, obese, incisions look fine. ALEX serous  Ext: WWP    Assessment/Plan:   Silvia Yoon  is a 57 year old female POD 5 s/p lap repair of perforated gastric ulcer. UGI without leak and NGT removed and started clears yesterday. H/o prior peptic ulcer disease and h.pylori, consulted [facility] for additional recommendations regarding ulcer disease and ongoing treatment as well as to establish care for future endoscopy in 8 weeks. Appreciate consult and recommendations.   - full liquid diet, nutrition consult- recommend liquids for 2 weeks post op  - GI consult as above  - continue zosyn, diflucan for 7 day course (day 5)  - lovenox for dvt ppx  - likely home tomorrow pending fever curve  - encourage IS and ambulation.    Edilma Navarrete MD  [facility]  [phone]              "

## 2023-04-18 NOTE — PROGRESS NOTES
"   04/18/23 1100   Appointment Info   Signing Clinician's Name / Credentials (PT) Celio Lewis DPT       Present no   Living Environment   People in Home alone   Current Living Arrangements apartment   Home Accessibility stairs to enter home  (3 sets of 7)   Stair Railings, Main Entrance railings on both sides of stairs   Transportation Anticipated family or friend will provide   Living Environment Comments Pt lives alone in an apartment. Stairs to enter. Pt reports her sister will pick her up upon discharge. Pt reports she will have a friend help her as needed.   Self-Care   Usual Activity Tolerance good   Current Activity Tolerance good   Regular Exercise No   Equipment Currently Used at Home cane, straight   Fall history within last six months yes   Number of times patient has fallen within last six months 1   Activity/Exercise/Self-Care Comment Pt reports being IND at baseline with all ADLs. Pt ambulates using a SEC at baseline and does not have a FWW. Pt drives.   General Information   Onset of Illness/Injury or Date of Surgery 04/18/23   Referring Physician Haley Collins, DO   Patient/Family Therapy Goals Statement (PT) \"To get better\"   Pertinent History of Current Problem (include personal factors and/or comorbidities that impact the POC) Per Chart: Silvia Yoon is a 57 year old female with below PMHx admitted on 4/13/2023. She presented with abdominal pain, nausea, and poor oral intake X1 week, found to have a perforated ulcer on CT, taken emergently to surgery by General Surgery. Hospitalist service was consulted for medical co-management.   Existing Precautions/Restrictions fall   Weight-Bearing Status - LLE full weight-bearing   Weight-Bearing Status - RLE full weight-bearing   Cognition   Orientation Status (Cognition) oriented x 3   Pain Assessment   Patient Currently in Pain No   Posture    Posture Forward head position;Protracted shoulders   Range of Motion " (ROM)   Range of Motion ROM is WFL   Strength (Manual Muscle Testing)   Strength (Manual Muscle Testing) Able to perform R SLR;Able to perform L SLR   Bed Mobility   Comment, (Bed Mobility) Did not assess   Transfers   Comment, (Transfers) Sit>stand w/ mod I   Gait/Stairs (Locomotion)   [place] Level (Gait) supervision   Assistive Device (Gait) other (see comments)  (IV pole)   Distance in Feet 5'   Distance in Feet (Gait) 200' x 2   Balance   Balance no deficits were identified   Sensory Examination   Sensory Perception patient reports no sensory changes   Clinical Impression   Criteria for Skilled Therapeutic Intervention Yes, treatment indicated   PT Diagnosis (PT) Impaired gait   Influenced by the following impairments Decreased activity tolerance   Functional limitations due to impairments Impaired functional mobility   Clinical Presentation (PT Evaluation Complexity) Stable/Uncomplicated   Clinical Presentation Rationale Clinical judgement   Clinical Decision Making (Complexity) low complexity   Planned Therapy Interventions (PT) balance training;bed mobility training;gait training;patient/family education;stair training;strengthening;transfer training   Risk & Benefits of therapy have been explained evaluation/treatment results reviewed;care plan/treatment goals reviewed;risks/benefits reviewed;participants voiced agreement with care plan;current/potential barriers reviewed;participants included;patient   PT Total Evaluation Time   PT Eval, Low Complexity Minutes (31153) 10   Plan of Care Review   Plan of Care Reviewed With patient   Physical Therapy Goals   PT Frequency One time eval and treatment only   PT Predicted Duration/Target Date for Goal Attainment 04/21/23   PT Goals Bed Mobility;Transfers;Gait;Stairs   PT: Bed Mobility Supervision/stand-by assist;Supine to/from sit;Goal Met   PT: Transfers Supervision/stand-by assist;Sit to/from stand;Assistive device;Goal Met   PT: Gait Supervision/stand-by  assist;Assistive device;150 feet;Goal Met   PT: Stairs Supervision/stand-by assist;Greater than 10 stairs;Rail on both sides;Goal Met   Interventions   Interventions Quick Adds Gait Training;Therapeutic Activity   Therapeutic Activity   Therapeutic Activities: dynamic activities to improve functional performance Minutes (91654) 10   Symptoms Noted During/After Treatment None   Treatment Detail/Skilled Intervention Greeted pt sitting in chair, agreed to PT. VSS on RA throughout session. Pt performed sit>stand x 5 w/ IV pole and SBA progressing to IND, verbal cues for hand placement. Pt ambulated to the bathroom to void. Pt able to perform pericares and hygiene without assist. After ambulation, pt returned to chair and performed stand>sit w/ IV pole and IND, verbal cues to descend in a slow, controlled motion. Pt ended session sitting in chair, with all needss met and call light within reach.   Gait Training   Gait Training Minutes (53604) 20   Symptoms Noted During/After Treatment (Gait Training) fatigue   Treatment Detail/Skilled Intervention Pt ambulated w/ IV pole and SBA. Pt ambulated with decreased gait speed, downward gaze, and steady. Verbal cues for upright gaze and posture, to increase step length, and pacing. Pt negotiated 20 stairs continuously using bilat rails and SBA. PT provided visual demonstration for safe stair negotiation. Pt negotiated with a step-to pattern and was steady. No LOB noted.   PT Discharge Planning   PT Plan DC   PT Discharge Recommendation (DC Rec) home;home with assist   PT Rationale for DC Rec Pt appears at/near baseline for functional mobility. Pt demonstrates safe and effective techniques with transfers, gait and stairs. Anticipate pt will be able to safely return home at discharge.   PT Brief overview of current status Sit>stand w/ mod I; gait w/ IV pole and SBA; stairs w/ bilat rails and SBA   Total Session Time   Timed Code Treatment Minutes 30   Total Session Time (sum of  timed and untimed services) 40

## 2023-04-18 NOTE — PROGRESS NOTES
[facility]    Medicine Progress Note - Hospitalist Service    Date of Admission:  4/13/2023    Assessment & Plan   Silvia Yoon is a 57 year old female with below PMHx admitted on 4/13/2023. She presented with abdominal pain, nausea, and poor oral intake X1 week, found to have a perforated ulcer on CT, taken emergently to surgery by General Surgery. Hospitalist service was consulted for medical co-management.      S/P diagnostic laparoscopy with repair of perforated ulcer (4/13/23)  Hx of ulcer, H. Pylori  Procedure per Dr. Navarrete.   * WBC 11.5, CT A/P with free air in the upper abdomen anteriorly with stranding in the region of the stomach and duodenum. There is an air-fluid collection posterior to the stomach containing some high attenuation fluid posteriorly. Findings are concerning for perforated   ulcer. There is some free fluid adjacent to the spleen. Moderate free fluid in the pelvic cul-de-sac.   *NGT removed 4/17 after no leak demonstrated on upper GI  -- Defer routine post-operative cares, drain to primary service   -- Pain management with PRN acetaminophen, oxycodone  -- DVT prophylaxis with lovenox 40 mg BID   -- Zosyn and fluconazole for infection prophylaxis x7 days (5/7)  -- IVF with LR + D5+ KCl 20 mEq/L at 50 mL/hr   -- Protonix 40 mg BID   -- carafate QID  -- Encourage IS and ambulation, PT consulted for stairs  -- full liquids  -- eventual endoscopy in 6-8 weeks post op, GI consulted     Severe hypokalemia- resolved  Hypophosphatemia  K 2.7 on admission. Phos 2.1  - Electrolyte replacement protocol in place     Hypoglycemia  In setting of prolonged NPO. BG down to 68 on 4/16  - hypoglycemia protocol available  - BID glucose checks, can discontinue if remains stable after another 24h with diet     VANDA- improving  Creat 1.24 on admission. Baseline 0.7-0.8.   - IVF as above   - resumed half tab PTA losartan on 4/18, hydrochlorothiazide remains on hold  - trend  "BMP     Leukocytosis- resolved  11.5 on admission. Reactive in the setting of above. Afebrile.   - IV antibiotics as above   - WBC normalized on 4/17     Hypertension  PTA hydrochlorothiazide 25mg daily, losartan 100mg daily, amlodipine   - PRN hydralazine and labetalol IV for SBP>160  - hydrochlorothiazide on hold  - resumed low dose losartan 50mg daily on 4/18  - resumed PTA amlodipine on 4/18     Anxiety  Depression  Insomnia  PTA cymbalta 60mg daily, gabapentin 600mg BID, trazodone 75mg at bedtime, resumed 4/17  - PRN atarax for anxiety available--she felt this helped a lot, a prescription has been sent down to pharmacy on 4/18     Hx of melanoma  Noted on back years ago s/p resection and no further treatment. Gets annual surveillance.      MATI  CPAP with home settings ordered    Severe Obesity  Estimated body mass index is 60.08 kg/m  as calculated from the following:    Height as of this encounter: 1.626 m (5' 4\").    Weight as of this encounter: 158.8 kg (350 lb).         Diet: Full Liquid Diet    DVT Prophylaxis: Enoxaparin (Lovenox) SQ  Bates Catheter: Not present  Lines: None     Cardiac Monitoring: None  Code Status: Full Code      Clinically Significant Risk Factors                         # Severe Obesity: Estimated body mass index is 60.08 kg/m  as calculated from the following:    Height as of this encounter: 1.626 m (5' 4\").    Weight as of this encounter: 158.8 kg (350 lb).          Disposition Plan     Expected Discharge Date: 04/19/2023,  3:00 PM    Destination: home            Haley Collins DO  Hospitalist Service  [facility]  Securely message with [employer] (more info)  Text page via [employer] Paging/Directory   ______________________________________________________________________    Interval History   Patient seen and examined. Slept well with addition of her cpap last night and some of her home meds back on. [place] the atarax really helped with her anxiety--she would like " prescription for discharge. Will try stairs with physical therapy today. Tolerating full liquids. Excited at prospect of discharging in next day or two as long as she has no fevers.(temps hovering below 100F).     Physical Exam   Vital Signs: Temp: 99.6  F (37.6  C) Temp src: Oral BP: (!) 159/84 Pulse: 86   Resp: 18 SpO2: 95 % O2 Device: None (Room air)    Weight: 350 lbs 0 oz     Constitutional: Awake, alert, cooperative, no apparent distress  Respiratory: Clear to auscultation bilaterally, no crackles or wheezing  Cardiovascular: Regular rate and rhythm, normal S1 and S2, and no murmur noted  GI: soft, non-distended, little tender LUQ. ALEX drain intact with serous drainage. Incisions with steri strips.  Skin/Integumen: No rashes, no cyanosis, trace to +1 edema  Other:     Medical Decision Making       35 MINUTES SPENT BY ME on the date of service doing chart review, history, exam, documentation & further activities per the note.          Data     I have personally reviewed the following data over the past 24 hrs:    8.5  \   10.3 (L)   / 322     143 111 (H) 6.2 /  93   3.6 23 1.03 (H) \       Imaging results reviewed over the past 24 hrs:   Recent Results (from the past 24 hour(s))   XR Upper GI Water Soluble    Narrative    UPPER GI WATER SOLUBLE April 17, 2023 1:52 PM     HISTORY: Status post repair perforated gastric ulcer, evaluate for  leak after repair and four days NGT decompression.    TECHNIQUE: 1.3 minutes fluoroscopy. Six spot images.    COMPARISON: CT 4/13/2023.    FINDINGS: Water-soluble contrast administered orally. There is a large  ulceration extending superiorly off the gastric antrum. No definite  leak from the ulcer by 5 minutes. Contrast flows into the duodenum and  remainder of the small bowel.      Impression    IMPRESSION: Large gastric ulcer again seen. No definite leakage of  contrast material from the ulcer. No obstruction.    LEIF LCAKEY MD         SYSTEM ID:  [id number]

## 2023-04-18 NOTE — PLAN OF CARE
Goal Outcome Evaluation:    POD#5  1. Diagnostic laparoscopy  2. Laparoscopic repair of gastric ulcer     Pt A&Ox4, able to let needs be known. Lap sites intact with steri-strips, dried drainage noted. ALEX drain in place- 25ml clear, yellow output, dressing intact. SOB with exertion. Utilizing home cpap while asleep, IS encouraged. Active bowel sounds, passing flatus, denied nausea, and reports still having loose stools. Denied pain. D5 in LR with 20 meq K+ infusing at 75ml. BG checks resulted: 100 and 84. Temp- 99.5. Ankle/hand edema- elevated. Ambulating with 1 assist and a walker. Call light within reach.      Writer assumed care of pt from [id number].  Georgia Barth RN on 4/18/2023 at 6:58 AM

## 2023-04-18 NOTE — CONSULTS
"MD Consult - \"plan for liquid diet for 2 weeks\"    Diet: Full Liquids    Visited with pt and sister this morning  Provided and reviewed handout on Full Liquid Diet  Explained that pt is to follow guidelines for the next 2 weeks  Sent pt various nutrition supplements to sample  Questions answered    Kassandra Grande RD, LD  Clinical Dietitian - [facility]   Pager - [phone]    "

## 2023-04-18 NOTE — CONSULTS
[facility] Consultation     Silvia Yoon  [address]  [facility] [address]  57 year old female     Admission Date/Time: 4/13/2023  Primary Care Provider: Eloisa Ref-Primary, Physician  Referring / Attending Physician:  Dennis     We were asked to see the patient in consultation by Dr. Navarrete for evaluation of PUD.    ASSESSMENT:    Perforated gastric ulcer requiring surgical repair 4/13/2023  H. Pylori neg on 2014 EGD  No NSAIDs predisposing to above ulcer    RECOMMENDATIONS:  Suggest BID PPI dosing.   Our office will arrange endoscopy in 2 months (Hospital based given BMI 60).  At that time gastric biopsies will be obtained to clarify H. pylori status as H pylori testing presently would be unreliable while on antibiotics and PPI.   Reviewed with the pt the proper dosing of PPI - 30mins prior to meals.     Thank you for involving us in this patient's care.  Please call me with any questions.    Total time spent in chart review, direct medical discussion, examination, and documentation was 20 minutes    Yefri Quinn DO   [facility]  Cell [phone]    ________________________________________________________________________        CC: abd pain     HPI:  Silvia Yoon is a 57 year old female who presented on the 13th with abdominal pain and imaging findings consistent with perforated gastric ulcer.  She was taken to the operating room where this was laparoscopically repaired.  She has done well over the last 24 to 48 hours without evidence of extravasation of contrast.  She is without abdominal pain presently, tolerating PPI.    The patient recalls being told, at some point, that she had H. pylori and thinks she may have been treated.  EGD in 2014 included negative H pylori biopsies.      She denies NSAIDs leading to this admission.  She denies any melena, or diarrhea.     ROS: A comprehensive ten point review of systems was negative aside from those in mentioned in the  HPI.      PAST MEDICAL HISTORY:  Patient Active Problem List    Diagnosis Date Noted     Hypokalemia 04/13/2023     Priority: Medium     Perforated ulcer (H) 04/13/2023     Priority: Medium     SOCIAL HISTORY:     FAMILY HISTORY:  No family history on file.  ALLERGIES:   Allergies   Allergen Reactions     Naproxen Unknown     Club feet and burning ankles.     MEDICATIONS:   Current Facility-Administered Medications   Medication     acetaminophen (TYLENOL) tablet 650 mg    Or     acetaminophen (TYLENOL) Suppository 650 mg     albuterol (PROVENTIL HFA/VENTOLIN HFA) inhaler     amLODIPine (NORVASC) tablet 5 mg     benzocaine-menthol (CHLORASEPTIC) 6-10 MG lozenge 1 lozenge     dextrose 5% in lactated ringers + KCl 20 mEq/L infusion     glucose gel 15-30 g    Or     dextrose 50 % injection 25-50 mL    Or     glucagon injection 1 mg     diazepam (VALIUM) injection 2.5 mg     DULoxetine (CYMBALTA) DR capsule 60 mg     enoxaparin ANTICOAGULANT (LOVENOX) injection 40 mg     fluconazole (DIFLUCAN) intermittent infusion 400 mg in NaCl     gabapentin (NEURONTIN) capsule 600 mg     hydrALAZINE (APRESOLINE) injection 10 mg     HYDROmorphone (DILAUDID) injection 0.2 mg    Or     HYDROmorphone (DILAUDID) injection 0.4 mg     hydrOXYzine (ATARAX) tablet 25 mg    Or     hydrOXYzine (ATARAX) tablet 50 mg     labetalol (NORMODYNE/TRANDATE) injection 10 mg     lidocaine (LMX4) cream     lidocaine 1 % 0.1-1 mL     naloxone (NARCAN) injection 0.2 mg    Or     naloxone (NARCAN) injection 0.4 mg    Or     naloxone (NARCAN) injection 0.2 mg    Or     naloxone (NARCAN) injection 0.4 mg     ondansetron (ZOFRAN ODT) ODT tab 4 mg    Or     ondansetron (ZOFRAN) injection 4 mg     oxyCODONE IR (ROXICODONE) half-tab 2.5 mg    Or     oxyCODONE (ROXICODONE) tablet 5 mg     pantoprazole (PROTONIX) IV push injection 40 mg     phenol (CHLORASEPTIC) 1.4 % spray 1 mL     piperacillin-tazobactam (ZOSYN) 3.375 g vial to attach to  mL bag      "prochlorperazine (COMPAZINE) injection 10 mg     sodium chloride (PF) 0.9% PF flush 3 mL     sodium chloride (PF) 0.9% PF flush 3 mL     sucralfate (CARAFATE) suspension 1 g     traZODone (DESYREL) half-tab 75 mg     PHYSICAL EXAM:   BP (!) 160/95 (BP Location: Left arm)   Pulse 86   Temp 99.6  F (37.6  C) (Oral)   Resp 18   Ht 1.626 m (5' 4\")   Wt (!) 158.8 kg (350 lb)   SpO2 95%   BMI 60.08 kg/m     GEN: Alert, oriented x3, communicative and in NAD.  SHAILESH: AT, anicteric, OP without erythema, exudate, or ulcers.    NECK: Supple.    LYMPH: No LAD noted.  HRT: RRR  LUNGS: CTA  ABD: Obese, ND, +BS, no guarding or pain to palpation, no rebound, no HSM.  SKIN: No rash, jaundice or spider angiomata     ADDITIONAL DATA:   I reviewed the patient's new clinical lab test results.   Recent Labs   Lab Test 04/18/23  0830 04/17/23  0800 04/17/23  0000 04/15/23  0659   WBC 8.5 7.1  --  12.1*   HGB 10.3* 10.1*  --  10.5*   MCV 97 97  --  99    300 372 300     Recent Labs   Lab Test 04/17/23  0800 04/15/23  0659 04/14/23  0734   POTASSIUM 3.7 3.9 4.3   CHLORIDE 112* 110* 102   CO2 24 24 29   BUN 9.1 14.6 15.5   ANIONGAP 9 9 10     Recent Labs   Lab Test 04/13/23  0224   ALBUMIN 3.7   BILITOTAL 0.4   ALT 16   AST 18        I reviewed the patient's new imaging results.               "

## 2023-04-18 NOTE — PLAN OF CARE
POD 5 from a gastric ulcer repair. A&Ox4. CMS - 1+ generalized edema, generally weak. Bowel sounds +x4, + flatus, loose BM x2, tolerating clear liquid diet. NG removed per general surgery. VSS ex HTN - hydralazine given. Lap sites approximated with steri strips & dried drainage. ALEX drain patent with serous output, dressing changed. Up with  SBA - ambulated in halls x2. Denies pain. Discharge home possible tomorrow.

## 2023-04-18 NOTE — PLAN OF CARE
Physical Therapy Discharge Summary    Reason for therapy discharge:    All goals and outcomes met, no further needs identified.    Progress towards therapy goal(s). See goals on Care Plan in Epic electronic health record for goal details.  Goals met    Therapy recommendation(s):    Recommend pt continue ambulating with nursing staff throughout remainder of hospital stay.

## 2023-04-19 VITALS
OXYGEN SATURATION: 97 % | HEART RATE: 90 BPM | HEIGHT: 64 IN | DIASTOLIC BLOOD PRESSURE: 68 MMHG | RESPIRATION RATE: 18 BRPM | TEMPERATURE: 99.3 F | BODY MASS INDEX: 50.02 KG/M2 | WEIGHT: 293 LBS | SYSTOLIC BLOOD PRESSURE: 128 MMHG

## 2023-04-19 LAB — GLUCOSE BLDC GLUCOMTR-MCNC: 78 MG/DL (ref 70–99)

## 2023-04-19 PROCEDURE — 250N000011 HC RX IP 250 OP 636: Performed by: HOSPITALIST

## 2023-04-19 PROCEDURE — 250N000013 HC RX MED GY IP 250 OP 250 PS 637: Performed by: HOSPITALIST

## 2023-04-19 PROCEDURE — 99207 PR NO BILLABLE SERVICE THIS VISIT: CPT | Performed by: HOSPITALIST

## 2023-04-19 PROCEDURE — 250N000011 HC RX IP 250 OP 636: Performed by: PHYSICIAN ASSISTANT

## 2023-04-19 PROCEDURE — 250N000013 HC RX MED GY IP 250 OP 250 PS 637: Performed by: INTERNAL MEDICINE

## 2023-04-19 PROCEDURE — 250N000013 HC RX MED GY IP 250 OP 250 PS 637: Performed by: SURGERY

## 2023-04-19 RX ORDER — SUCRALFATE ORAL 1 G/10ML
1 SUSPENSION ORAL 4 TIMES DAILY
Qty: 414 ML | Refills: 1 | Status: SHIPPED | OUTPATIENT
Start: 2023-04-19 | End: 2023-05-10

## 2023-04-19 RX ORDER — OXYCODONE HYDROCHLORIDE 5 MG/1
5 TABLET ORAL EVERY 4 HOURS PRN
Qty: 10 TABLET | Refills: 0 | Status: SHIPPED | OUTPATIENT
Start: 2023-04-19

## 2023-04-19 RX ADMIN — SUCRALFATE ORAL 1 G: 1 SUSPENSION ORAL at 07:29

## 2023-04-19 RX ADMIN — ACETAMINOPHEN 650 MG: 325 TABLET ORAL at 12:36

## 2023-04-19 RX ADMIN — SUCRALFATE ORAL 1 G: 1 SUSPENSION ORAL at 12:17

## 2023-04-19 RX ADMIN — PANTOPRAZOLE SODIUM 40 MG: 40 TABLET, DELAYED RELEASE ORAL at 07:29

## 2023-04-19 RX ADMIN — FLUCONAZOLE 400 MG: 400 INJECTION, SOLUTION INTRAVENOUS at 10:11

## 2023-04-19 RX ADMIN — PIPERACILLIN AND TAZOBACTAM 3.38 G: 3; .375 INJECTION, POWDER, FOR SOLUTION INTRAVENOUS at 08:31

## 2023-04-19 RX ADMIN — ENOXAPARIN SODIUM 40 MG: 40 INJECTION SUBCUTANEOUS at 08:35

## 2023-04-19 RX ADMIN — LOSARTAN POTASSIUM 50 MG: 50 TABLET, FILM COATED ORAL at 08:17

## 2023-04-19 RX ADMIN — DULOXETINE HYDROCHLORIDE 60 MG: 60 CAPSULE, DELAYED RELEASE ORAL at 08:18

## 2023-04-19 RX ADMIN — GABAPENTIN 600 MG: 300 CAPSULE ORAL at 08:17

## 2023-04-19 RX ADMIN — PIPERACILLIN AND TAZOBACTAM 3.38 G: 3; .375 INJECTION, POWDER, FOR SOLUTION INTRAVENOUS at 02:03

## 2023-04-19 RX ADMIN — AMLODIPINE BESYLATE 5 MG: 5 TABLET ORAL at 08:17

## 2023-04-19 RX ADMIN — HYDROXYZINE HYDROCHLORIDE 25 MG: 25 TABLET ORAL at 08:17

## 2023-04-19 ASSESSMENT — ACTIVITIES OF DAILY LIVING (ADL)
ADLS_ACUITY_SCORE: 28
ADLS_ACUITY_SCORE: 29
ADLS_ACUITY_SCORE: 28

## 2023-04-19 NOTE — PROGRESS NOTES
Brief hospitalist note  Non-billing    Chart reviewed, messaged with RN - no new concerns for hospitalist.  See prior notes for full details. Primary team discharging patient today after final dose of abx/antifungal this morning. VANDA resolving - should be okay to resume PTA antihtn regimen and ideally check BMP with pcp in a week or so.  BP variable but improving and PTA regimen resuming - anticipate her BP will normalize to PTA levels - PCP follow up recommended as needed/previously planned.     Other details and plan per primary team.

## 2023-04-19 NOTE — PROGRESS NOTES
Pt is alert and oriented x 4, TMAX 99.3 VSS on room air. Denied pain. No N/V passing flatus. Voiding without difficulty. Lap sites CDI, ALEX drain patent output serous/clear. Up with SBA. Discharge today.

## 2023-04-19 NOTE — PROGRESS NOTES
"General Surgery Progress Note    Subjective: pt doing well. No nausea. Having liquid stools.     Objective: BP (!) 154/86   Pulse 88   Temp 99.3  F (37.4  C) (Oral)   Resp 18   Ht 1.626 m (5' 4\")   Wt (!) 158.8 kg (350 lb)   SpO2 98%   BMI 60.08 kg/m    Gen: alert, no distress  CV: RRR  Pulm: nonlabored breathing  Abd: soft, incisions look good, ALEX drain removed  Ext: WWP    Assessment/Plan:   Silvia Yoon  is a 57 year old female s/p lap repair of perforated gastric ulcer. ALEX out and home today.   - follow up w GI for endoscopy in 8 weeks.   - PPI BID  - carafate  - done with abx/antifungal today, discharge after 11am dose  - follow up with me in 2-4 weeks  - discharge orders placed    Edilma Navarrete MD  [facility]  [phone]              "

## 2023-04-19 NOTE — PLAN OF CARE
POD 6 from a gastric ulcer repair. A&Ox4. CMS - 1+ generalized edema. WALLIS. Bowel sounds +x4, + flatus, tolerating full liquid diet. VSS ex HTN - resolved with scheduled meds. Lap sites approximated with steri strips & dried drainage. ALEX drain removed by MD. Up independently, shower completed. Denies pain. Discharged home with sisters assist. AVS given & reviewed, all questions answered. Sent home with meds, AVS, & all belongings.

## 2023-04-27 NOTE — DISCHARGE SUMMARY
Discharge Summary    Silvia Yoon MRN# [id number]   YOB: 1965 Age: 57 year old     Date of Admission:  4/13/2023  Date of Discharge:  4/19/2023 12:48 PM  Admitting Physician:  Edilma Navarrete MD  Discharging Service:  Surgery  Primary Provider: No Ref-Primary, Physician         Admission/Discharge Diagnosis:   Principle Diagnosis: Perforated gastric ulcer  Secondary diagnosis: Hypokalemia [E87.6]  VANDA         Procedures:   Procedure(s):  Diagnostic laparoscopy with laparoscopic repair of perforated gastric ulcer         Brief History of Illness:   This patient was a 57 year old female who presented with acute onset of abdominal pain.  She was found to have pneumoperitoneum with concern of a perforated peptic ulcer. After discussing the risks, benefits, and possible complications, an informed consent was obtained and the patient underwent the above procedure. Please see the Operative Report for full details.         Hospital Course:   The patient was admitted for post op cares.  She was on strict NPO with NGT decompression.  She underwent an UGI with showed no leak POD 5.  Her NGT was removed and her diet was advanced slowly to fulls.  Her creatinine continued to improve and was 1.03 on day of discharge.  She complted a 7 day course of abx and antifungal.  She will follow up with GI for an EGD and H pylori testing in 8 weeks.  The patient's pain was controlled and she was tolerating a full liquid on day of discharge.  The patient was discharged home in stable condition by the surgical service.  She verbalized understanding of all discharge instructions.  She was asked to call with any further questions or concerns.  Please see chart for details.          Consultations:     Consultation during this admission received from gastroenterology and hospitalist.          Discharge Disposition:     Discharged to home          Condition on Discharge:     Discharge condition: Stable   Discharge vitals: Blood  "pressure 128/68, pulse 90, temperature 99.3  F (37.4  C), temperature source Oral, resp. rate 18, height 1.626 m (5' 4\"), weight (!) 158.8 kg (350 lb), SpO2 97 %.          Discharge Medications:     Discharge Medication List as of 4/19/2023 11:25 AM      START taking these medications    Details   hydrOXYzine (ATARAX) 25 MG tablet Take 1 tablet (25 mg) by mouth every 6 hours as needed for other or anxiety (adjuvant pain), Disp-30 tablet, R-0, E-PrescribeFuture refills by PCP  Physician No Ref-Primary with phone number None.      oxyCODONE (ROXICODONE) 5 MG tablet Take 1 tablet (5 mg) by mouth every 4 hours as needed for moderate pain, Disp-10 tablet, R-0, E-Prescribe      pantoprazole (PROTONIX) 40 MG EC tablet Take 1 tablet (40 mg) by mouth 2 times daily (before meals), Disp-60 tablet, R-1, E-PrescribeFuture refills by PCP  Physician No Ref-Primary with phone number None.      sucralfate (CARAFATE) 1 GM/10ML suspension Take 10 mLs (1 g) by mouth 4 times daily for 21 days, Disp-414 mL, R-1, E-Prescribe         CONTINUE these medications which have NOT CHANGED    Details   acetaminophen (TYLENOL) 500 MG tablet Take 1,000 mg by mouth 3 times daily, Historical      albuterol (PROAIR HFA/PROVENTIL HFA/VENTOLIN HFA) 108 (90 Base) MCG/ACT inhaler Inhale 1-2 puffs into the lungs every 6 hours as needed for shortness of breath, wheezing or cough, Historical      amLODIPine (NORVASC) 5 MG tablet Take 5 mg by mouth daily, Historical      DULoxetine (CYMBALTA) 60 MG capsule Take 60 mg by mouth daily, Historical      gabapentin (NEURONTIN) 300 MG capsule Take 600 mg by mouth 2 times daily, Historical      hydrochlorothiazide (HYDRODIURIL) 25 MG tablet Take 25 mg by mouth daily, Historical      losartan (COZAAR) 100 MG tablet Take 100 mg by mouth daily, Historical      meloxicam (MOBIC) 15 MG tablet Take 15 mg by mouth daily, Historical      traZODone (DESYREL) 50 MG tablet Take 75 mg by mouth At Bedtime, Historical    "   Vitamin D3 (CHOLECALCIFEROL) 25 mcg (1000 units) tablet Take 25 mcg by mouth daily, Historical                Discharge Instructions:   Follow-up and recommended labs and tests      1. Follow up with me,  Edilma Navarrete MD, within 2-4 weeks for post operative visit. Please call [phone] to schedule.    2. Follow up with GI for endoscopy 8 weeks.    3. PCP for maintenance health exam, BP management, and BMP in 1 week           After Care Instructions     Activity      Your activity upon discharge: activity as tolerated. No heavy lifting > 20 lbs or strenuous exercise x 2-3 weeks. No driving or alcohol while on pain meds.         Diet      Follow this diet upon discharge: Orders Placed This Encounter      Full Liquid Diet    Please stay on a liquid diet for 2 weeks and then slowly transition to soft diet x 2 weeks and then regular         Wound care and dressings      Instructions to care for your wound at home: as directed. Ok to shower, do not submerge incisions for 2 weeks. Steri strips fall off on their own. Leave drain dressing on for 2 days. Change as needed to keep clean and dry.               This discharge summary was completed by chart review only. I did not see this patient on the day of discharge.     Shayy Pickard PA-C, dictating on behalf of Edilma Navarrete MD  Office #: [phone]

## 2023-04-30 ENCOUNTER — APPOINTMENT (OUTPATIENT)
Dept: CT IMAGING | Facility: CLINIC | Age: 58
End: 2023-04-30
Attending: EMERGENCY MEDICINE
Payer: COMMERCIAL

## 2023-04-30 ENCOUNTER — HOSPITAL ENCOUNTER (EMERGENCY)
Facility: CLINIC | Age: 58
Discharge: HOME | End: 2023-04-30
Attending: EMERGENCY MEDICINE | Admitting: EMERGENCY MEDICINE
Payer: COMMERCIAL

## 2023-04-30 VITALS
HEART RATE: 90 BPM | DIASTOLIC BLOOD PRESSURE: 102 MMHG | TEMPERATURE: 98.9 F | OXYGEN SATURATION: 95 % | SYSTOLIC BLOOD PRESSURE: 147 MMHG | RESPIRATION RATE: 20 BRPM

## 2023-04-30 DIAGNOSIS — K62.5 BRBPR (BRIGHT RED BLOOD PER RECTUM): ICD-10-CM

## 2023-04-30 LAB
ABO/RH(D): NORMAL
ALBUMIN SERPL BCG-MCNC: 4 G/DL (ref 3.5–5.2)
ALP SERPL-CCNC: 68 U/L (ref 35–104)
ALT SERPL W P-5'-P-CCNC: 24 U/L (ref 10–35)
ANION GAP SERPL CALCULATED.3IONS-SCNC: 17 MMOL/L (ref 7–15)
ANTIBODY SCREEN: NEGATIVE
APTT PPP: 27 SECONDS (ref 22–38)
AST SERPL W P-5'-P-CCNC: 39 U/L (ref 10–35)
BASOPHILS # BLD AUTO: 0 10E3/UL (ref 0–0.2)
BASOPHILS NFR BLD AUTO: 1 %
BILIRUB SERPL-MCNC: 0.4 MG/DL
BUN SERPL-MCNC: 9.6 MG/DL (ref 6–20)
CALCIUM SERPL-MCNC: 9.9 MG/DL (ref 8.6–10)
CHLORIDE SERPL-SCNC: 97 MMOL/L (ref 98–107)
CREAT SERPL-MCNC: 1.05 MG/DL (ref 0.51–0.95)
DEPRECATED HCO3 PLAS-SCNC: 25 MMOL/L (ref 22–29)
EOSINOPHIL # BLD AUTO: 0.1 10E3/UL (ref 0–0.7)
EOSINOPHIL NFR BLD AUTO: 3 %
ERYTHROCYTE [DISTWIDTH] IN BLOOD BY AUTOMATED COUNT: 14.9 % (ref 10–15)
GFR SERPL CREATININE-BSD FRML MDRD: 62 ML/MIN/1.73M2
GLUCOSE SERPL-MCNC: 140 MG/DL (ref 70–99)
HCT VFR BLD AUTO: 38.2 % (ref 35–47)
HGB BLD-MCNC: 12.3 G/DL (ref 11.7–15.7)
HGB BLD-MCNC: 12.6 G/DL (ref 11.7–15.7)
HOLD SPECIMEN: NORMAL
IMM GRANULOCYTES # BLD: 0 10E3/UL
IMM GRANULOCYTES NFR BLD: 0 %
INR PPP: 1.14 (ref 0.85–1.15)
LYMPHOCYTES # BLD AUTO: 1.4 10E3/UL (ref 0.8–5.3)
LYMPHOCYTES NFR BLD AUTO: 28 %
MCH RBC QN AUTO: 31.1 PG (ref 26.5–33)
MCHC RBC AUTO-ENTMCNC: 33 G/DL (ref 31.5–36.5)
MCV RBC AUTO: 94 FL (ref 78–100)
MONOCYTES # BLD AUTO: 0.6 10E3/UL (ref 0–1.3)
MONOCYTES NFR BLD AUTO: 11 %
NEUTROPHILS # BLD AUTO: 2.8 10E3/UL (ref 1.6–8.3)
NEUTROPHILS NFR BLD AUTO: 57 %
NRBC # BLD AUTO: 0 10E3/UL
NRBC BLD AUTO-RTO: 0 /100
PLATELET # BLD AUTO: 491 10E3/UL (ref 150–450)
POTASSIUM SERPL-SCNC: 3.6 MMOL/L (ref 3.4–5.3)
PROT SERPL-MCNC: 7.5 G/DL (ref 6.4–8.3)
RBC # BLD AUTO: 4.05 10E6/UL (ref 3.8–5.2)
SODIUM SERPL-SCNC: 139 MMOL/L (ref 136–145)
SPECIMEN EXPIRATION DATE: NORMAL
WBC # BLD AUTO: 4.9 10E3/UL (ref 4–11)

## 2023-04-30 PROCEDURE — 80053 COMPREHEN METABOLIC PANEL: CPT | Performed by: EMERGENCY MEDICINE

## 2023-04-30 PROCEDURE — 250N000009 HC RX 250: Performed by: EMERGENCY MEDICINE

## 2023-04-30 PROCEDURE — 86901 BLOOD TYPING SEROLOGIC RH(D): CPT | Performed by: EMERGENCY MEDICINE

## 2023-04-30 PROCEDURE — 36415 COLL VENOUS BLD VENIPUNCTURE: CPT | Performed by: EMERGENCY MEDICINE

## 2023-04-30 PROCEDURE — 250N000011 HC RX IP 250 OP 636: Performed by: EMERGENCY MEDICINE

## 2023-04-30 PROCEDURE — 99285 EMERGENCY DEPT VISIT HI MDM: CPT | Mod: 25

## 2023-04-30 PROCEDURE — 85018 HEMOGLOBIN: CPT | Performed by: EMERGENCY MEDICINE

## 2023-04-30 PROCEDURE — 85025 COMPLETE CBC W/AUTO DIFF WBC: CPT | Performed by: EMERGENCY MEDICINE

## 2023-04-30 PROCEDURE — 74174 CTA ABD&PLVS W/CONTRAST: CPT

## 2023-04-30 PROCEDURE — 85610 PROTHROMBIN TIME: CPT | Performed by: EMERGENCY MEDICINE

## 2023-04-30 PROCEDURE — 85730 THROMBOPLASTIN TIME PARTIAL: CPT | Performed by: EMERGENCY MEDICINE

## 2023-04-30 RX ORDER — IOPAMIDOL 755 MG/ML
80 INJECTION, SOLUTION INTRAVASCULAR ONCE
Status: COMPLETED | OUTPATIENT
Start: 2023-04-30 | End: 2023-04-30

## 2023-04-30 RX ADMIN — IOPAMIDOL 80 ML: 755 INJECTION, SOLUTION INTRAVENOUS at 13:58

## 2023-04-30 RX ADMIN — SODIUM CHLORIDE 80 ML: 9 INJECTION, SOLUTION INTRAVENOUS at 13:58

## 2023-04-30 ASSESSMENT — ACTIVITIES OF DAILY LIVING (ADL)
ADLS_ACUITY_SCORE: 35
ADLS_ACUITY_SCORE: 35

## 2023-04-30 NOTE — ED TRIAGE NOTES
Patient here for bright red rectal bleeding that started last night and has persisted today. Patient had perforated GI ulcer surgery about 2 weeks ago and was hospitalized for 1 week. Patient notes iron taste in mouth and gag/cough sensations.

## 2023-04-30 NOTE — ED PROVIDER NOTES
"  History     Chief Complaint:  Rectal Bleeding       HPI   Silvia Yoon is a 57 year old female who was recently admitted April 13 through 19 for perforated gastric ulcer where she underwent diagnostic laparoscopy.  Starting last night she began to again noticed bright red blood per rectum in the toilet.  She notes she has been having a lot of diarrhea so it is difficult to say whether or not the blood is in the stool.  She has no known recent hemorrhoids.  She states the bleeding in the toilet stopped around 415 but then again picked up this morning.  She also notes when coughing that she is \"tasting iron.\"  She denies any lightheadedness, shortness of breath, new abdominal pain.      Independent Historian:   None - Patient Only    Review of External Notes: admission and discharge notes from recent admission and surgery     ROS:  Review of Systems    Allergies:  Naproxen     Medications:    acetaminophen (TYLENOL) 500 MG tablet  albuterol (PROAIR HFA/PROVENTIL HFA/VENTOLIN HFA) 108 (90 Base) MCG/ACT inhaler  amLODIPine (NORVASC) 5 MG tablet  DULoxetine (CYMBALTA) 60 MG capsule  gabapentin (NEURONTIN) 300 MG capsule  hydrochlorothiazide (HYDRODIURIL) 25 MG tablet  hydrOXYzine (ATARAX) 25 MG tablet  losartan (COZAAR) 100 MG tablet  meloxicam (MOBIC) 15 MG tablet  oxyCODONE (ROXICODONE) 5 MG tablet  pantoprazole (PROTONIX) 40 MG EC tablet  sucralfate (CARAFATE) 1 GM/10ML suspension  traZODone (DESYREL) 50 MG tablet  Vitamin D3 (CHOLECALCIFEROL) 25 mcg (1000 units) tablet        Past Medical History:    Past Medical History:   Diagnosis Date     Gastroesophageal reflux disease with esophagitis      Hypertension      Sleep apnea        Past Surgical History:    Past Surgical History:   Procedure Laterality Date     LAPAROSCOPY DIAGNOSTIC (GENERAL) N/A 4/13/2023    Procedure: Diagnostic laparoscopy with repair of perforated ulcer,;  Surgeon: Edilma Navarrete MD;  Location:  OR        Family History:    family " history is not on file.    Social History:     PCP: No Ref-Primary, Physician     Physical Exam     Patient Vitals for the past 24 hrs:   BP Temp Temp src Pulse Resp SpO2   04/30/23 1345 (!) 158/87 -- -- 82 -- 96 %   04/30/23 1330 (!) 152/90 -- -- 79 -- 96 %   04/30/23 1315 (!) 140/86 -- -- 82 -- 95 %   04/30/23 1300 (!) 153/90 -- -- 77 -- 95 %   04/30/23 1245 (!) 151/91 -- -- 83 -- 93 %   04/30/23 1230 (!) 142/84 -- -- 83 -- 93 %   04/30/23 1200 132/86 -- -- 94 -- 97 %   04/30/23 1057 (!) 180/85 98.9  F (37.2  C) Temporal 111 20 97 %        Physical Exam  General/Appearance: appears stated age, well-groomed, appears comfortable, elevated BMI  Eyes: EOMI, no scleral injection, no icterus  ENT: MMM  Neck: supple, nl ROM, no stiffness  Cardiovascular: RRR, nl S1S2, no m/r/g, 2+ pulses in all 4 extremities, cap refill <2sec  Respiratory: CTAB, good air movement throughout, no wheezes/rhonchi/rales, no increased WOB, no retractions  GI: abd soft, non-distended, nttp,  no HSM, no rebound, no guarding, nl BS  MSK: ERAZO, good tone, no bony abnormality  Skin: warm and well-perfused, no rash, no edema, no ecchymosis, nl turgor  Neuro: GCS 15, alert and oriented, no gross focal neuro deficits  Psych: interacts appropriately  Heme: no petechia, no purpura, no active bleeding        Emergency Department Course     Imaging:  CTA Abdomen Pelvis with Contrast   Final Result   IMPRESSION:   1.  Postop change to the gastric antrum from previous perforated ulcer repair. No complications.      2.  Nothing for active GI bleeding.      3.  No acute new findings.         Report per radiology    Laboratory:  Labs Ordered and Resulted from Time of ED Arrival to Time of ED Departure   COMPREHENSIVE METABOLIC PANEL - Abnormal       Result Value    Sodium 139      Potassium 3.6      Chloride 97 (*)     Carbon Dioxide (CO2) 25      Anion Gap 17 (*)     Urea Nitrogen 9.6      Creatinine 1.05 (*)     Calcium 9.9      Glucose 140 (*)     Alkaline  Phosphatase 68      AST 39 (*)     ALT 24      Protein Total 7.5      Albumin 4.0      Bilirubin Total 0.4      GFR Estimate 62     CBC WITH PLATELETS AND DIFFERENTIAL - Abnormal    WBC Count 4.9      RBC Count 4.05      Hemoglobin 12.6      Hematocrit 38.2      MCV 94      MCH 31.1      MCHC 33.0      RDW 14.9      Platelet Count 491 (*)     % Neutrophils 57      % Lymphocytes 28      % Monocytes 11      % Eosinophils 3      % Basophils 1      % Immature Granulocytes 0      NRBCs per 100 WBC 0      Absolute Neutrophils 2.8      Absolute Lymphocytes 1.4      Absolute Monocytes 0.6      Absolute Eosinophils 0.1      Absolute Basophils 0.0      Absolute Immature Granulocytes 0.0      Absolute NRBCs 0.0     INR - Normal    INR 1.14     PARTIAL THROMBOPLASTIN TIME - Normal    aPTT 27     HEMOGLOBIN - Normal    Hemoglobin 12.3     TYPE AND SCREEN, ADULT    ABO/RH(D) O POS      Antibody Screen Negative      SPECIMEN EXPIRATION DATE [id number]     ABO/RH TYPE AND SCREEN        Emergency Department Course & Assessments:    Interventions:  Medications   iopamidol (ISOVUE-370) solution 80 mL (80 mLs Intravenous $Given 4/30/23 1358)   sodium chloride 0.9 % bag 500mL for CT scan flush use (80 mLs Intravenous $Given 4/30/23 1358)        Assessments:  1500 Updated pt    Independent Interpretation (X-rays, CTs, rhythm strip):  None    Consultations/Discussion of Management or Tests:  None        Social Determinants of Health affecting care:   None    Disposition:  The patient was discharged to home.     Impression & Plan        Medical Decision Making:  This patient is a very pleasant 57-year-old female who was recently admitted for about a week for perforated gastric ulcer.  She presents today as she noticed some bright red blood in the toilet.  Here CT for GI bleed with both arterial and venous phase were negative.  2-hour delta hemoglobins show normal numbers relatively unchanged.  She is hemodynamically stable.  I have low  concern for brisk bleed at this point.  I do think she would benefit from a colonoscopy but I think this is something that can be done as an outpatient.  I will be ordering this from the ER.  She feels comfortable with the plan of getting this done within the next couple days.  Of course if bleeding continues or worsens, she develops lightheadedness or any other sign or symptom of bleeding stigmata I would ask her to return to the ER.  Diagnosis:    ICD-10-CM    1. BRBPR (bright red blood per rectum)  K62.5               Priscila Cotton MD  04/30/23 1506

## 2023-05-01 ENCOUNTER — TELEPHONE (OUTPATIENT)
Dept: GASTROENTEROLOGY | Facility: CLINIC | Age: 58
End: 2023-05-01
Payer: COMMERCIAL

## 2023-05-01 RX ORDER — BISACODYL 5 MG/1
TABLET, DELAYED RELEASE ORAL
Qty: 4 TABLET | Refills: 0 | Status: SHIPPED | OUTPATIENT
Start: 2023-05-01

## 2023-05-01 NOTE — TELEPHONE ENCOUNTER
Screening Questions  BLUE  KIND OF PREP RED  LOCATION [review exclusion criteria] GREEN  SEDATION TYPE        Yes Are you active on mychart?       [place] Ordering/Referring Provider?        [facility]-patient will call with info What type of coverage do you have?      N Have you had a positive covid test in the last 14 days?     60.1 1. BMI  [BMI 40+ - review exclusion criteria& smart-phrase document]    Yes  2. Are you able to give consent for your medical care? [IF NO,RN REVIEW]          N  3. Are you taking any prescription pain medications on a routine schedule   (ex narcotics: oxycodone, roxicodone, oxycontin,  and percocet)? [RN Review]        NA  3a. EXTENDED PREP What kind of prescription?     N 4. Do you have any chemical dependencies such as alcohol, street drugs, or methadone?        **If yes 3- 5 , please schedule with MAC sedation.**          IF YES TO ANY 6 - 10 - HOSPITAL SETTING ONLY.     N 6.   Do you need assistance transferring?     N 7.   Have you had a heart or lung transplant?    N 8.   Are you currently on dialysis?   N 9.   Do you use daily home oxygen?   N 10. Do you take nitroglycerin?   10a. N If yes, how often?     11. [FEMALES]  NA Are you currently pregnant?    11a. NA If yes, how many weeks? [ Greater than 12 weeks, OR NEEDED]    N 12. Do you have Pulmonary Hypertension? *NEED PAC APPT AT [facility] w/ [facility]*     N 13. [review exclusion criteria]  Do you have any implantable devices in your body (pacemaker, defib, LVAD)?    N 14. In the past 6 months, have you had any heart related issues including cardiomyopathy or heart attack?     14a. N If yes, did it require cardiac stenting if so when?     N 15. Have you had a stroke or Transient ischemic attack (TIA - aka  mini stroke ) within 6 months?      Yes-uses cpap 16. Do you have mod to severe Obstructive Sleep Apnea?  [Hospital only]    N 17. Do you have SEVERE AND UNCONTROLLED asthma? *NEED PAC APPT AT [facility] w/MAC*     18. Are you currently  "taking any blood thinners?     18a. No. Continue to 19.   18b. Yes/no Blood Thinner: No [CONTINUE TO #19]    N 19. Do you take the medication Phentermine?    19a. If yes, \"Hold for 7 days before procedure.  Please consult your prescribing provider if you have questions about holding this medication.\"     N  20. Do you have chronic kidney disease?      N  21. Do you have a diagnosis of diabetes?     N  22. On a regular basis do you go 3-5 days between bowel movements?      23. Preferred LOCAL Pharmacy for Pre Prescription    [ LIST ONLY ONE PHARMACY]     [facility] [place]        - CLOSING REMINDERS -    Informed patient they will need an adult    Cannot take any type of public or medical transportation alone    Conscious Sedation- Needs  for 6 hours after the procedure       MAC/General-Needs  for 24 hours after procedure    Pre-Procedure Covid test to be completed [[facility] PCR Testing Required]    Confirmed Nurse will call to complete assessment       - SCHEDULING DETAILS -  Yes Hospital Setting Required? If yes, what is the exclusion?: MATI and BMI   Keo  Surgeon    5/4/23  Date of Procedure  Lower Endoscopy [Colonoscopy]  Type of Procedure Scheduled  [facility] EXTENDED - If you answer yes to questions #1, #3, #22 ([place] and CF pts)Which Colonoscopy Prep was Sent?   BMI  CS Sedation Type     NO PAC / Pre-op Required                 "

## 2023-05-04 ENCOUNTER — HOSPITAL ENCOUNTER (OUTPATIENT)
Facility: CLINIC | Age: 58
Discharge: HOME | End: 2023-05-04
Attending: INTERNAL MEDICINE | Admitting: INTERNAL MEDICINE
Payer: COMMERCIAL

## 2023-05-04 VITALS
WEIGHT: 293 LBS | BODY MASS INDEX: 54.93 KG/M2 | RESPIRATION RATE: 17 BRPM | HEART RATE: 72 BPM | DIASTOLIC BLOOD PRESSURE: 88 MMHG | OXYGEN SATURATION: 95 % | SYSTOLIC BLOOD PRESSURE: 149 MMHG

## 2023-05-04 DIAGNOSIS — Z12.11 ENCOUNTER FOR SCREENING COLONOSCOPY: Primary | ICD-10-CM

## 2023-05-04 LAB — COLONOSCOPY: NORMAL

## 2023-05-04 PROCEDURE — 250N000011 HC RX IP 250 OP 636: Performed by: INTERNAL MEDICINE

## 2023-05-04 PROCEDURE — 45378 DIAGNOSTIC COLONOSCOPY: CPT | Performed by: INTERNAL MEDICINE

## 2023-05-04 PROCEDURE — G0500 MOD SEDAT ENDO SERVICE >5YRS: HCPCS | Performed by: INTERNAL MEDICINE

## 2023-05-04 RX ORDER — FENTANYL CITRATE 50 UG/ML
INJECTION, SOLUTION INTRAMUSCULAR; INTRAVENOUS PRN
Status: DISCONTINUED | OUTPATIENT
Start: 2023-05-04 | End: 2023-05-04 | Stop reason: HOSPADM

## 2023-05-04 ASSESSMENT — ACTIVITIES OF DAILY LIVING (ADL): ADLS_ACUITY_SCORE: 33

## 2023-05-04 NOTE — H&P
[facility]  Pre-Endoscopy History and Physical     Silvia Yoon MRN# [id number]   YOB: 1965 Age: 57 year old     Date of Procedure: 5/4/2023  Primary care provider: No Ref-Primary, Physician  Type of Endoscopy: colonoscopy  Reason for Procedure: REctal bleeding  Type of Anesthesia Anticipated: Moderate Sedation    HPI:    Silvia is a 57 year old female who will be undergoing the above procedure.      A history and physical has been performed. The patient's medications and allergies have been reviewed. The risks and benefits of the procedure and the sedation options and risks were discussed with the patient.  All questions were answered and informed consent was obtained.      She denies a personal or family history of anesthesia complications or bleeding disorders.     Allergies   Allergen Reactions     Naproxen Unknown     Club feet and burning ankles.        Prior to Admission Medications   Prescriptions Last Dose Informant Patient Reported? Taking?   DULoxetine (CYMBALTA) 60 MG capsule 5/4/2023  Yes Yes   Sig: Take 60 mg by mouth daily   Vitamin D3 (CHOLECALCIFEROL) 25 mcg (1000 units) tablet 5/4/2023  Yes Yes   Sig: Take 25 mcg by mouth daily   acetaminophen (TYLENOL) 500 MG tablet prn  Yes No   Sig: Take 1,000 mg by mouth 3 times daily   albuterol (PROAIR HFA/PROVENTIL HFA/VENTOLIN HFA) 108 (90 Base) MCG/ACT inhaler prn  Yes No   Sig: Inhale 1-2 puffs into the lungs every 6 hours as needed for shortness of breath, wheezing or cough   amLODIPine (NORVASC) 5 MG tablet 5/4/2023  Yes Yes   Sig: Take 5 mg by mouth daily   gabapentin (NEURONTIN) 300 MG capsule 5/4/2023  Yes Yes   Sig: Take 600 mg by mouth 2 times daily   hydrOXYzine (ATARAX) 25 MG tablet prn  No No   Sig: Take 1 tablet (25 mg) by mouth every 6 hours as needed for other or anxiety (adjuvant pain)   hydrochlorothiazide (HYDRODIURIL) 25 MG tablet 5/4/2023  Yes Yes   Sig: Take 25 mg by mouth daily   losartan  "(COZAAR) 100 MG tablet 5/4/2023  Yes Yes   Sig: Take 100 mg by mouth daily   meloxicam (MOBIC) 15 MG tablet 5/4/2023  Yes Yes   Sig: Take 15 mg by mouth daily   oxyCODONE (ROXICODONE) 5 MG tablet prn  No No   Sig: Take 1 tablet (5 mg) by mouth every 4 hours as needed for moderate pain   pantoprazole (PROTONIX) 40 MG EC tablet 5/4/2023  No Yes   Sig: Take 1 tablet (40 mg) by mouth 2 times daily (before meals)   sucralfate (CARAFATE) 1 GM/10ML suspension 5/3/2023  No Yes   Sig: Take 10 mLs (1 g) by mouth 4 times daily for 21 days   traZODone (DESYREL) 50 MG tablet 5/3/2023  Yes Yes   Sig: Take 75 mg by mouth At Bedtime      Facility-Administered Medications: None       Patient Active Problem List   Diagnosis     Hypokalemia     Perforated ulcer (H)        Past Medical History:   Diagnosis Date     Gastroesophageal reflux disease with esophagitis      Hypertension      Sleep apnea         Past Surgical History:   Procedure Laterality Date     LAPAROSCOPY DIAGNOSTIC (GENERAL) N/A 4/13/2023    Procedure: Diagnostic laparoscopy with repair of perforated ulcer,;  Surgeon: Edilma Navarrete MD;  Location:  OR       Social History     Tobacco Use     Smoking status: Never     Smokeless tobacco: Never   Vaping Use     Vaping status: Not on file   Substance Use Topics     Alcohol use: Not Currently       No family history on file.    REVIEW OF SYSTEMS:     5 point ROS negative except as noted above in HPI, including Gen., Resp., CV, GI &  system review.      PHYSICAL EXAM:   BP (!) 148/106   Pulse 74   Resp 20   Wt 145.2 kg (320 lb)   SpO2 96%   BMI 54.93 kg/m   Estimated body mass index is 54.93 kg/m  as calculated from the following:    Height as of 4/13/23: 1.626 m (5' 4\").    Weight as of this encounter: 145.2 kg (320 lb).   GENERAL APPEARANCE: healthy, alert and no distress  MENTAL STATUS: alert  AIRWAY EXAM: Mallampatti Class I (visualization of the soft palate, fauces, uvula, anterior and posterior " pillars)  RESP: lungs clear to auscultation - no rales, rhonchi or wheezes  CV: regular rates and rhythm      DIAGNOSTICS:    Not indicated      IMPRESSION   ASA Class 1 - Healthy patient, no medical problems        PLAN:       Plan for colonoscopy. We discussed the risks, benefits and alternatives and the patient wished to proceed.    The above has been forwarded to the consulting provider.      Signed Electronically by: Kristopher Crowley MD,MD  May 4, 2023      [facility]  Ph: [phone] Fax: [phone]     14-May-2017 02:07

## 2023-05-16 ENCOUNTER — TELEPHONE (OUTPATIENT)
Dept: SURGERY | Facility: CLINIC | Age: 58
End: 2023-05-16
Payer: COMMERCIAL

## 2023-05-16 NOTE — TELEPHONE ENCOUNTER
Attempted to call patient to discuss refill authorization for sucralfate that was received from [facility]    No answer    Will send Twila Owen RN-BSN

## 2023-06-02 ENCOUNTER — HEALTH MAINTENANCE LETTER (OUTPATIENT)
Age: 58
End: 2023-06-02

## 2024-06-29 ENCOUNTER — HEALTH MAINTENANCE LETTER (OUTPATIENT)
Age: 59
End: 2024-06-29

## 2025-06-22 ENCOUNTER — HEALTH MAINTENANCE LETTER (OUTPATIENT)
Age: 60
End: 2025-06-22

## 2025-07-13 ENCOUNTER — HEALTH MAINTENANCE LETTER (OUTPATIENT)
Age: 60
End: 2025-07-13

## (undated) DEVICE — GLOVE BIOGEL PI MICRO INDICATOR UNDERGLOVE SZ 6.5 48965

## (undated) DEVICE — SYSTEM LAPAROVUE VISIBILITY LAPVUE10

## (undated) DEVICE — ESU LIGASURE MARYLAND LAPAROSCOPIC SLR/DVDR 5MMX37CM LF1937

## (undated) DEVICE — DRSG TEGADERM 4X4 3/4" 1626

## (undated) DEVICE — ENDO TROCAR SLEEVE KII Z-THREADED 05X100MM CTS02

## (undated) DEVICE — DRAIN JACKSON PRATT RESERVOIR 100ML SU130-1305

## (undated) DEVICE — SU MONOCRYL 4-0 PS-2 18" UND Y496G

## (undated) DEVICE — GLOVE BIOGEL PI MICRO SZ 6.0 48560

## (undated) DEVICE — SUCTION IRR STRYKERFLOW II W/TIP 250-070-520

## (undated) DEVICE — SU VICRYL 2-0 SH 27" J317H

## (undated) DEVICE — SU VICRYL 3-0 SH 27" J316H

## (undated) DEVICE — Device

## (undated) DEVICE — ESU GROUND PAD UNIVERSAL W/O CORD

## (undated) DEVICE — BLADE KNIFE SURG 11 371111

## (undated) DEVICE — DRAIN JACKSON PRATT 19FR ROUND SU130-1325

## (undated) DEVICE — LINEN TOWEL PACK X5 5464

## (undated) DEVICE — GOWN IMPERVIOUS BREATHABLE SMART LG 89015

## (undated) DEVICE — ESU HOLDER LAP INST DISP PURPLE LONG 330MM H-PRO-330

## (undated) DEVICE — DRSG GAUZE 4X4" 3033

## (undated) DEVICE — SU VICRYL 0 UR-6 27" J603H

## (undated) DEVICE — DRSG STERI STRIP 1/2X4" R1547

## (undated) DEVICE — ENDO TROCAR FIRST ENTRY KII FIOS Z-THRD 05X100MM CTF03

## (undated) DEVICE — SOL NACL 0.9% IRRIG 1000ML BOTTLE 2F7124

## (undated) DEVICE — SOL WATER IRRIG 1000ML BOTTLE 2F7114

## (undated) DEVICE — SU VICRYL 3-0 SH CR 8X18" J774

## (undated) DEVICE — ESU ENDO SCISSORS 5MM CVD 5DCS

## (undated) DEVICE — DEVICE SUTURE GRASPER TROCAR CLOSURE 14GA PMITCSG

## (undated) DEVICE — APPLICATOR VISTASEAL RIGID TIP 35CM VSTL35

## (undated) DEVICE — ENDO TROCAR FIRST ENTRY KII FIOS Z-THRD 12X100MM CTF73

## (undated) DEVICE — SOL NACL 0.9% INJ 1000ML BAG 2B1324X

## (undated) DEVICE — SU ETHILON 2-0 FS 18" 664H

## (undated) DEVICE — PACK LAP CHOLE SLC15LCFSD

## (undated) DEVICE — SU VICRYL 0 TIE 12X18" J906G

## (undated) DEVICE — PREP CHLORAPREP 26ML TINTED HI-LITE ORANGE 930815

## (undated) RX ORDER — DEXAMETHASONE SODIUM PHOSPHATE 4 MG/ML
INJECTION, SOLUTION INTRA-ARTICULAR; INTRALESIONAL; INTRAMUSCULAR; INTRAVENOUS; SOFT TISSUE
Status: DISPENSED
Start: 2023-04-13

## (undated) RX ORDER — PROPOFOL 10 MG/ML
INJECTION, EMULSION INTRAVENOUS
Status: DISPENSED
Start: 2023-04-13

## (undated) RX ORDER — ONDANSETRON 2 MG/ML
INJECTION INTRAMUSCULAR; INTRAVENOUS
Status: DISPENSED
Start: 2023-04-13

## (undated) RX ORDER — HYDROMORPHONE HYDROCHLORIDE 1 MG/ML
INJECTION, SOLUTION INTRAMUSCULAR; INTRAVENOUS; SUBCUTANEOUS
Status: DISPENSED
Start: 2023-04-13

## (undated) RX ORDER — BUPIVACAINE HYDROCHLORIDE AND EPINEPHRINE 2.5; 5 MG/ML; UG/ML
INJECTION, SOLUTION EPIDURAL; INFILTRATION; INTRACAUDAL; PERINEURAL
Status: DISPENSED
Start: 2023-04-13

## (undated) RX ORDER — ALBUTEROL SULFATE 90 UG/1
AEROSOL, METERED RESPIRATORY (INHALATION)
Status: DISPENSED
Start: 2023-04-13

## (undated) RX ORDER — FENTANYL CITRATE 50 UG/ML
INJECTION, SOLUTION INTRAMUSCULAR; INTRAVENOUS
Status: DISPENSED
Start: 2023-04-13

## (undated) RX ORDER — FENTANYL CITRATE 50 UG/ML
INJECTION, SOLUTION INTRAMUSCULAR; INTRAVENOUS
Status: DISPENSED
Start: 2023-05-04